# Patient Record
Sex: FEMALE | Race: WHITE
[De-identification: names, ages, dates, MRNs, and addresses within clinical notes are randomized per-mention and may not be internally consistent; named-entity substitution may affect disease eponyms.]

---

## 2017-01-17 ENCOUNTER — HOSPITAL ENCOUNTER (OUTPATIENT)
Dept: HOSPITAL 10 - OBT | Age: 22
Discharge: HOME | End: 2017-01-17
Attending: OBSTETRICS & GYNECOLOGY
Payer: MEDICAID

## 2017-01-17 VITALS — RESPIRATION RATE: 18 BRPM | DIASTOLIC BLOOD PRESSURE: 77 MMHG | SYSTOLIC BLOOD PRESSURE: 119 MMHG

## 2017-01-17 VITALS
BODY MASS INDEX: 28.98 KG/M2 | WEIGHT: 143.74 LBS | HEIGHT: 59 IN | HEIGHT: 59 IN | BODY MASS INDEX: 28.98 KG/M2 | WEIGHT: 143.74 LBS

## 2017-01-17 DIAGNOSIS — O26.893: Primary | ICD-10-CM

## 2017-01-17 DIAGNOSIS — R42: ICD-10-CM

## 2017-01-17 DIAGNOSIS — Z3A.34: ICD-10-CM

## 2017-01-17 DIAGNOSIS — R10.9: ICD-10-CM

## 2017-01-17 LAB
ALBUMIN SERPL-MCNC: 3.5 G/DL
ALBUMIN/GLOB SERPL: 1.06 {RATIO}
ALP SERPL-CCNC: 146 IU/L
ALT SERPL-CCNC: 25 IU/L
ANION GAP SERPL CALC-SCNC: 16 MMOL/L
AST SERPL-CCNC: 19 IU/L
BASOPHILS # BLD AUTO: 0 10^3/UL
BASOPHILS NFR BLD: 0.4 %
BILIRUB DIRECT SERPL-MCNC: 0 MG/DL
BILIRUB SERPL-MCNC: 0.3 MG/DL
BUN SERPL-MCNC: 2 MG/DL
CALCIUM SERPL-MCNC: 8.8 MG/DL
CHLORIDE SERPL-SCNC: 105 MMOL/L
CO2 SERPL-SCNC: 21 MMOL/L
CONDITION: 1
CREAT SERPL-MCNC: 0.39 MG/DL
EOSINOPHIL # BLD: 0 10^3/UL
EOSINOPHIL NFR BLD: 0.5 %
ERYTHROCYTE [DISTWIDTH] IN BLOOD BY AUTOMATED COUNT: 13.7 %
GLOBULIN SER-MCNC: 3.3 G/DL
GLUCOSE SERPL-MCNC: 76 MG/DL
HCT VFR BLD CALC: 36.5 %
HGB BLD-MCNC: 12.3 G/DL
LYMPHOCYTES # BLD AUTO: 1.8 10^3/UL
LYMPHOCYTES NFR BLD AUTO: 19.2 %
MCH RBC QN AUTO: 31.3 PG
MCHC RBC AUTO-ENTMCNC: 33.6 G/DL
MCV RBC AUTO: 93 FL
MONOCYTES # BLD: 0.7 10^3/UL
MONOCYTES NFR BLD: 7.1 %
NEUTROPHILS # BLD: 7 10^3/UL
NEUTROPHILS NFR BLD AUTO: 72.8 %
NRBC # BLD MANUAL: 0 10^3/UL
NRBC BLD QL: 0 /100WBC
PLATELET # BLD: 306 10^3/UL
PMV BLD AUTO: 7.2 FL
POTASSIUM SERPL-SCNC: 3.6 MMOL/L
PROT SERPL-MCNC: 6.8 G/DL
RBC # BLD AUTO: 3.93 10^6/UL
SODIUM SERPL-SCNC: 138 MMOL/L
WBC # BLD AUTO: 9.6 10^3/UL
WBC # BLD: 9.6 10^3/UL

## 2017-01-17 PROCEDURE — 76818 FETAL BIOPHYS PROFILE W/NST: CPT

## 2017-01-17 PROCEDURE — 81003 URINALYSIS AUTO W/O SCOPE: CPT

## 2017-01-17 PROCEDURE — 80053 COMPREHEN METABOLIC PANEL: CPT

## 2017-01-17 PROCEDURE — 85025 COMPLETE CBC W/AUTO DIFF WBC: CPT

## 2017-01-17 PROCEDURE — 96360 HYDRATION IV INFUSION INIT: CPT

## 2017-01-17 PROCEDURE — G0463 HOSPITAL OUTPT CLINIC VISIT: HCPCS

## 2017-01-17 PROCEDURE — 76816 OB US FOLLOW-UP PER FETUS: CPT

## 2017-01-17 NOTE — RADRPT
PROCEDURE:   US OB. 

 

CLINICAL INDICATION:   Abdominal pain. 

 

TECHNIQUE:   Multiple sonographic images of the pelvis were obtained.  Transabdominal imaging only w
as performed.  The images were reviewed on a PACS workstation. 

 

COMPARISON:   No prior studies are available for comparison. 

 

FINDINGS:

There is a single viable intrauterine gestation.  

Cardiac activity is present with 124 beats per minute. 

There is a cephalic presentation. 

 

Measurements were made in order to determine fetal age. The results are as follows:

BPD = 8.48 cm

HC = 30.74 cm

AC = 30.48 cm

FL = 6.59 cm

Estimated gestational age of approximately 34 weeks 2 days.  

The estimated date of delivery is 02/26/2017.  

The EFW = 2387 g.

EFW percentile: 29%

 

The placenta is left lateral, grade 2. There is no evidence for an abruption or placenta previa.

 

 

IMPRESSION:

 

1.  Single viable intrauterine gestation of approximately 34 weeks 2 days, based on ultrasound measu
rements. The estimated date of delivery is 02/26/2017. 

2.  EFW percentile: 29%.

 

 

RPTAT: HTAR

_____________________________________________ 

.Tay Collazo MD, MD           Date    Time 

Electronically viewed and signed by .Tay Collazo MD, MD on 01/17/2017 22:08 

 

D:  01/17/2017 22:08  T:  01/17/2017 22:08

.R/

## 2017-01-17 NOTE — RADRPT
PROCEDURE:   OB ultrasound for biophysical profile 

 

CLINICAL INDICATION:   Abdominal pain.

 

TECHNIQUE:   Multiple sonographic images of the pelvis were obtained.  Transabdominal view of the gr
avid uterus are available for review.  The images were reviewed on a PACS workstation.  

 

COMPARISON:   None 

 

FINDINGS:

 

Fetal breathing movement = 2/2

Fetal tone = 2/2

Fetal motion = 2/2

Amniotic fluid = 2/2

 

RUTH = 10.8 cm

Single live intrauterine pregnancy in cephalic presentation with fetal cardiac activity (129 bpm).  

Left lateral placenta, grade 2.

 

IMPRESSION:

 

1.  Single viable intrauterine gestation.  

2.  Biophysical profile = 8/8.

3.  RUTH = 10.8 cm. 

 

 

RPTAT: HTAR

_____________________________________________ 

.Tay Collazo MD, MD           Date    Time 

Electronically viewed and signed by .Tay Collazo MD, MD on 01/17/2017 22:09 

 

D:  01/17/2017 22:09  T:  01/17/2017 22:09

.R/

## 2017-01-17 NOTE — QN
Documentation


Comment


OB tiage:


@34+wks GA with dizziness and some pressure


No VB +FM No LOF No CTXs


NST reassuring for GA 


[place] WNL


--->BPP Vaginal exam and Labs and Iv hydration ordered


If patient feels improved and labos and Ultrasonographic findings are wnl she 

can be discharged











SOCRATES SUN M.D. 2017 20:21

## 2017-01-24 ENCOUNTER — HOSPITAL ENCOUNTER (OUTPATIENT)
Dept: HOSPITAL 10 - OBT | Age: 22
Discharge: HOME | End: 2017-01-24
Attending: OBSTETRICS & GYNECOLOGY
Payer: MEDICAID

## 2017-01-24 VITALS — DIASTOLIC BLOOD PRESSURE: 66 MMHG | HEART RATE: 108 BPM | SYSTOLIC BLOOD PRESSURE: 118 MMHG | RESPIRATION RATE: 18 BRPM

## 2017-01-24 VITALS
BODY MASS INDEX: 27.38 KG/M2 | BODY MASS INDEX: 27.38 KG/M2 | HEIGHT: 59 IN | HEIGHT: 59 IN | WEIGHT: 135.8 LBS | WEIGHT: 135.8 LBS

## 2017-01-24 DIAGNOSIS — Z3A.35: ICD-10-CM

## 2017-01-24 DIAGNOSIS — O13.3: Primary | ICD-10-CM

## 2017-01-24 LAB
ADD UMIC: YES
ALBUMIN SERPL-MCNC: 3.2 G/DL (ref 3.3–4.9)
ALBUMIN/GLOB SERPL: 1.03 {RATIO}
ALP SERPL-CCNC: 132 IU/L (ref 42–121)
ALT SERPL-CCNC: 22 IU/L (ref 13–69)
ANION GAP SERPL CALC-SCNC: 11 MMOL/L (ref 8–16)
AST SERPL-CCNC: 18 IU/L (ref 15–46)
BACTERIA #/AREA URNS HPF: (no result) /[HPF]
BASOPHILS # BLD AUTO: 0 10^3/UL (ref 0–0.1)
BASOPHILS NFR BLD: 0.3 % (ref 0–2)
BILIRUB DIRECT SERPL-MCNC: 0 MG/DL (ref 0–0.2)
BILIRUB SERPL-MCNC: 0.2 MG/DL (ref 0.2–1.3)
BUN SERPL-MCNC: 4 MG/DL (ref 7–20)
CALCIUM SERPL-MCNC: 8.8 MG/DL (ref 8.4–10.2)
CHLORIDE SERPL-SCNC: 104 MMOL/L (ref 97–110)
CO2 SERPL-SCNC: 25 MMOL/L (ref 21–31)
COLOR UR: (no result)
CONDITION: 1
CREAT SERPL-MCNC: 0.52 MG/DL (ref 0.44–1)
EOSINOPHIL # BLD: 0.1 10^3/UL (ref 0–0.5)
EOSINOPHIL NFR BLD: 0.8 % (ref 0–7)
ERYTHROCYTE [DISTWIDTH] IN BLOOD BY AUTOMATED COUNT: 13.9 % (ref 11.5–14.5)
GLOBULIN SER-MCNC: 3.1 G/DL (ref 1.3–3.2)
GLUCOSE SERPL-MCNC: 97 MG/DL (ref 70–220)
HCT VFR BLD CALC: 34 % (ref 37–47)
HGB BLD-MCNC: 11.4 G/DL (ref 12–16)
KETONES UR STRIP.AUTO-MCNC: NEGATIVE MG/DL
LYMPHOCYTES # BLD AUTO: 1.9 10^3/UL (ref 0.8–2.9)
LYMPHOCYTES NFR BLD AUTO: 19.6 % (ref 15–51)
MCH RBC QN AUTO: 31.4 PG (ref 29–33)
MCHC RBC AUTO-ENTMCNC: 33.6 G/DL (ref 32–37)
MCV RBC AUTO: 93.4 FL (ref 82–101)
MONOCYTES # BLD: 0.8 10^3/UL (ref 0.3–0.9)
MONOCYTES NFR BLD: 8.4 % (ref 0–11)
NEUTROPHILS # BLD: 6.8 10^3/UL (ref 1.6–7.5)
NEUTROPHILS NFR BLD AUTO: 70.9 % (ref 39–77)
NITRITE UR QL STRIP.AUTO: NEGATIVE
NRBC # BLD MANUAL: 0 10^3/UL (ref 0–0)
NRBC BLD QL: 0 /100WBC (ref 0–0)
PLATELET # BLD: 282 10^3/UL (ref 140–440)
PMV BLD AUTO: 6.6 FL (ref 7.4–10.4)
POTASSIUM SERPL-SCNC: 4.3 MMOL/L (ref 3.5–5.1)
PROT SERPL-MCNC: 6.3 G/DL (ref 6.1–8.1)
RBC # BLD AUTO: 3.64 10^6/UL (ref 4.2–5.4)
RBC # UR AUTO: NEGATIVE /UL
RBC #/AREA URNS HPF: (no result) /HPF
SODIUM SERPL-SCNC: 136 MMOL/L (ref 135–144)
SQUAMOUS #/AREA URNS HPF: (no result) /[HPF]
URATE SERPL-MCNC: 4.2 MG/DL (ref 3.1–7.9)
URINE BILIRUBIN (DIP): NEGATIVE
URINE TOTAL PROTEIN (DIP): NEGATIVE
UROBILINOGEN UR STRIP-ACNC: (no result) (ref 0.1–1)
WBC # BLD AUTO: 9.6 10^3/UL (ref 4.8–10.8)
WBC # BLD: 9.6 10^3/UL (ref 4.8–10.8)
WBC # UR STRIP: (no result) /UL

## 2017-01-24 PROCEDURE — 80053 COMPREHEN METABOLIC PANEL: CPT

## 2017-01-24 PROCEDURE — G0463 HOSPITAL OUTPT CLINIC VISIT: HCPCS

## 2017-01-24 PROCEDURE — 76818 FETAL BIOPHYS PROFILE W/NST: CPT

## 2017-01-24 PROCEDURE — 85025 COMPLETE CBC W/AUTO DIFF WBC: CPT

## 2017-01-24 PROCEDURE — 81003 URINALYSIS AUTO W/O SCOPE: CPT

## 2017-01-24 PROCEDURE — 81001 URINALYSIS AUTO W/SCOPE: CPT

## 2017-01-24 PROCEDURE — 84560 ASSAY OF URINE/URIC ACID: CPT

## 2017-01-24 NOTE — RADRPT
PROCEDURE:   OB ultrasound for biophysical profile 

 

CLINICAL INDICATION:   Pregnancy induced hypertension.  Clinical estimated gestational age is 35 wee
ks 6 days with estimated date of delivery 02/22/2017.

 

TECHNIQUE:   Multiple sonographic images of the pelvis were obtained.  Transabdominal view of the gr
avid uterus are available for review.  The images were reviewed on a PACS workstation.  

 

COMPARISON:   01/17/2017 

 

FINDINGS:

 

Fetal breathing movement = 2/2

Fetal tone = 2/2

Fetal motion = 2/2

RUTH = 2/2

 

RUTH = 11.75 cm

Single live intrauterine pregnancy in cephalic position with fetal cardiac activity. Fetal heart rat
e equals 131 beats per minute. The placenta is left lateral, grade II  

 

IMPRESSION:

 

1.  Single live intrauterine gestation.  

2.  Biophysical profile = 8/8.

3.  RUTH = 11.75 cm. 

 

 

RPTAT: HJES

_____________________________________________ 

.Titi Guerrero MD, MD           Date    Time 

Electronically viewed and signed by .Titi Guerrero MD, MD on 01/24/2017 22:25 

 

D:  01/24/2017 22:25  T:  01/24/2017 22:25

.S/

## 2017-01-24 NOTE — HP
Date/Time of Note


Date/Time of Note


DATE: 17 


TIME: 23:06





OB - History


Hx of Present Pregnancy


Chief Complaint:  Evaluation of oreeclampsia


Estimated Due Date:  2017


:  1


Para:  0


Spontaneous :  0


Therapeutic :  0


Prenatal Care:  Good Care


Obstetrical Complications:  None


Medical Complications:  None





Past Family/Social History


*


Past Medical, Surgical, Family and Obstetric Histories reviewed from prenatal 

chart.





OB  Admission Exam


Vital Signs


Vital Signs





 Vital Signs








  Date Time  Temp Pulse Resp B/P Pulse Ox O2 Delivery O2 Flow Rate FiO2


 


17 21:03 98.4 108 18 118/66 99 Room Air  











Physical Exam


HEENT:  WNL


Heart:  Rhythm Normal


Lungs:  Clear


Abdomen:  WNL


Extremities:  Normal


Reflexes:  Normal


Last 72 hours Lab Results


 CBC & BMP


17 22:05











 Liver Function








Test


  17


22:05


 


Alanine Aminotransferase


(ALT/SGPT) 22  


 


 


Albumin 3.2  L


 


Alkaline Phosphatase 132  H


 


Aspartate Amino Transf


(AST/SGOT) 18  


 


 


Direct Bilirubin 0.00  


 


Total Protein 6.3  











OB  Assessment/Plan


Reason for admission:  observation


Plan:  Other (Work up revealed no sign of preeclampsia)


Other plan:


D/C home











ZULEIMA BALDWIN MD 2017 23:10

## 2017-01-25 NOTE — TRIAGE
===================================

OB Triage

===================================

Datetime Report Generated by CPN: 01/25/2017 00:25

   

   

===========================

Datetime: 01/24/2017 23:00

===========================

   

 Stage of Pregnancy:  OB Triage

   

===================================

Labor Evaluation

===================================

   

 Frequency:  occasional 

 Monitor Mode:  External

 Duration (sec)2399:  70

 Quality:  Mild

 Pattern:  Normal: <= 5 Contractions in 10 Minutes

 Resting Tone [place]:  Relaxed

   

===================================

Fetal Heart Rate

===================================

   

 FHR Baseline Rate:  125

 Monitor Mode:  External US

 Variability:  Moderate 6-25 bpm

 Accelerations:  15X15

 Decelerations:  None

 Category:  Category I

   

===================================

Pain Assessment

===================================

   

 Pain Scale:  0

 Pain Presence:  None/Denies

 Pain Type:  N/A

 Pain Goal:  0

 Pain Relief Measures:  Comfort Measures

   

===========================

Datetime: 01/24/2017 22:10

===========================

   

 Stage of Pregnancy:  OB Triage

   

===================================

Labor Evaluation

===================================

   

 Frequency:  occasional 

 Monitor Mode:  External

 Duration (sec)2399:  70

 Quality:  Mild

 Pattern:  Normal: <= 5 Contractions in 10 Minutes

 Resting Tone [place]:  Relaxed

   

===================================

Fetal Heart Rate

===================================

   

 FHR Baseline Rate:  130

 Monitor Mode:  External US

 Variability:  Moderate 6-25 bpm

 Accelerations:  15X15

 Decelerations:  None

 Category:  Category I

   

===========================

Datetime: 01/24/2017 21:00

===========================

   

   

===================================

Maternal Assessment

===================================

   

 Level of Consciousness:  Fully Conscious

 DTR's/Clonus:  DTRs 2+; No Clonus

 Headache:  Denies

 Blurred Vision:  No

 Nausea/Vomiting:  Denies

 RUQ Epigastric Pain:  Denies

 Facial Edema:  None

   

===================================

Labor Evaluation

===================================

   

 Frequency:  x2

 Monitor Mode:  External

 Duration (sec)2399:  70-90

 Quality:  Mild

 Pattern:  Normal: <= 5 Contractions in 10 Minutes

 Resting Tone [place]:  Relaxed

   

===================================

Fetal Heart Rate

===================================

   

 FHR Baseline Rate:  135

 Monitor Mode:  External US

 Variability:  Moderate 6-25 bpm

 Accelerations:  15X15

 Decelerations:  None

 Category:  Category I

   

===================================

Pain Assessment

===================================

   

 Pain Scale:  0

 Pain Presence:  None/Denies

 Pain Type:  N/A

 Pain Goal:  0

   

===========================

Datetime: 01/24/2017 20:40

===========================

   

 Assessment Type:  Triage

   

===================================

Maternal Assessment

===================================

   

 Level of Consciousness:  Fully Conscious

 DTR's/Clonus:  DTRs 2+; No Clonus

 Headache:  Denies

 Blurred Vision:  No

 Respiratory Effort:  Unlabored

 Nausea/Vomiting:  Denies

 RUQ Epigastric Pain:  Denies

 Lower Extremities Edema:  None

     Degree:  None

 Upper Extremities Edema:  None

     Degree:  None

 Facial Edema:  None

   

===================================

Fall Risk Assessment

===================================

   

 History of Falling:  (0) No

 Secondary Diagnosis:  (0) No

 Ambulatory Aid:  (0) Bedrest/Nurse Assist

 IV Therapy:  (0) No

 Gait:  (0) Normal/Bedrest/Immobile

 Mental Status:  (0) Oriented to Own Ability

 Fall Score:  0

 Fall Risk Score Definition:  No Risk: No action required

   

===========================

Datetime: 01/24/2017 20:15

===========================

   

 Time of Arrival:  01/24/2017 20:12

 EGA:  35.6

 Arrived By:  Ambulatory

 Arrived From:  Dr. Office

 Chief Complaint:  sent in from clinic PIH 

 Fetal Movement:  Present

 Contractions:  Occasional

 Vaginal Bleeding:  None

 Vaginal Discharge:  Denies

 Recent Sexual Intercouse:  Denies

 Abdominal Trauma:  Not Applicable

 Patient Complaints:  Other

 Initial Plan:  NST, CBC, CMP, uric acid, EFW, BPP 

   

===========================

Datetime: 01/17/2017 19:59

===========================

   

 EGA:  34.6

   

===========================

Datetime: 01/17/2017 19:39

===========================

   

 Fall Score:  0

 Fall Risk Score Definition:  No Risk: No action required

## 2017-02-15 ENCOUNTER — HOSPITAL ENCOUNTER (OUTPATIENT)
Dept: HOSPITAL 10 - OBT | Age: 22
Discharge: HOME | End: 2017-02-15
Attending: OBSTETRICS & GYNECOLOGY
Payer: MEDICAID

## 2017-02-15 VITALS — SYSTOLIC BLOOD PRESSURE: 120 MMHG | DIASTOLIC BLOOD PRESSURE: 78 MMHG

## 2017-02-15 VITALS
HEIGHT: 59 IN | WEIGHT: 155.65 LBS | BODY MASS INDEX: 31.38 KG/M2 | BODY MASS INDEX: 31.38 KG/M2 | HEIGHT: 59 IN | WEIGHT: 155.65 LBS

## 2017-02-15 DIAGNOSIS — Z3A.39: ICD-10-CM

## 2017-02-15 DIAGNOSIS — O21.0: ICD-10-CM

## 2017-02-15 DIAGNOSIS — R51: ICD-10-CM

## 2017-02-15 LAB
ADD UMIC: YES
ALBUMIN SERPL-MCNC: 3.2 G/DL (ref 3.3–4.9)
ALBUMIN/GLOB SERPL: 1.03 {RATIO}
ALP SERPL-CCNC: 177 IU/L (ref 42–121)
ALT SERPL-CCNC: 26 IU/L (ref 13–69)
ANION GAP SERPL CALC-SCNC: 13 MMOL/L (ref 8–16)
APTT BLD: 28.1 SEC (ref 25–35)
AST SERPL-CCNC: 23 IU/L (ref 15–46)
BACTERIA #/AREA URNS HPF: (no result) /[HPF]
BASOPHILS # BLD AUTO: 0 10^3/UL (ref 0–0.1)
BASOPHILS NFR BLD: 0.3 % (ref 0–2)
BILIRUB DIRECT SERPL-MCNC: 0 MG/DL (ref 0–0.2)
BILIRUB SERPL-MCNC: 0.3 MG/DL (ref 0.2–1.3)
BUN SERPL-MCNC: 4 MG/DL (ref 7–20)
CALCIUM SERPL-MCNC: 8.7 MG/DL (ref 8.4–10.2)
CHLORIDE SERPL-SCNC: 104 MMOL/L (ref 97–110)
CO2 SERPL-SCNC: 24 MMOL/L (ref 21–31)
COLOR UR: (no result)
CONDITION: 1
CREAT SERPL-MCNC: 0.45 MG/DL (ref 0.44–1)
EOSINOPHIL # BLD: 0 10^3/UL (ref 0–0.5)
EOSINOPHIL NFR BLD: 0.4 % (ref 0–7)
ERYTHROCYTE [DISTWIDTH] IN BLOOD BY AUTOMATED COUNT: 14.5 % (ref 11.5–14.5)
GLOBULIN SER-MCNC: 3.1 G/DL (ref 1.3–3.2)
GLUCOSE SERPL-MCNC: 83 MG/DL (ref 70–220)
GLUCOSE UR STRIP-MCNC: NEGATIVE %
HCT VFR BLD CALC: 36.1 % (ref 37–47)
HGB BLD-MCNC: 12.1 G/DL (ref 12–16)
INR PPP: 0.9
KETONES UR STRIP.AUTO-MCNC: NEGATIVE MG/DL
LYMPHOCYTES # BLD AUTO: 1.7 10^3/UL (ref 0.8–2.9)
LYMPHOCYTES NFR BLD AUTO: 17.1 % (ref 15–51)
MCH RBC QN AUTO: 31.5 PG (ref 29–33)
MCHC RBC AUTO-ENTMCNC: 33.6 G/DL (ref 32–37)
MCV RBC AUTO: 93.8 FL (ref 82–101)
MONOCYTES # BLD: 0.8 10^3/UL (ref 0.3–0.9)
MONOCYTES NFR BLD: 8.2 % (ref 0–11)
NEUTROPHILS # BLD: 7.4 10^3/UL (ref 1.6–7.5)
NEUTROPHILS NFR BLD AUTO: 74 % (ref 39–77)
NITRITE UR QL STRIP.AUTO: NEGATIVE
NRBC # BLD MANUAL: 0 10^3/UL (ref 0–0)
NRBC BLD QL: 0 /100WBC (ref 0–0)
PLATELET # BLD: 275 10^3/UL (ref 140–440)
PMV BLD AUTO: 7.1 FL (ref 7.4–10.4)
POTASSIUM SERPL-SCNC: 3.9 MMOL/L (ref 3.5–5.1)
PROT SERPL-MCNC: 6.3 G/DL (ref 6.1–8.1)
PROTHROMBIN TIME: 12.1 SEC (ref 12.2–14.2)
PT RATIO: 0.9
RBC # BLD AUTO: 3.84 10^6/UL (ref 4.2–5.4)
RBC # UR AUTO: NEGATIVE /UL
RBC #/AREA URNS HPF: (no result) /HPF
SODIUM SERPL-SCNC: 137 MMOL/L (ref 135–144)
URATE SERPL-MCNC: 4.2 MG/DL (ref 3.1–7.9)
URINE BILIRUBIN (DIP): NEGATIVE
URINE TOTAL PROTEIN (DIP): NEGATIVE
UROBILINOGEN UR STRIP-ACNC: (no result) (ref 0.1–1)
WBC # BLD AUTO: 10 10^3/UL (ref 4.8–10.8)
WBC # BLD: 10 10^3/UL (ref 4.8–10.8)
WBC # UR STRIP: (no result) /UL

## 2017-02-15 PROCEDURE — 85610 PROTHROMBIN TIME: CPT

## 2017-02-15 PROCEDURE — 76818 FETAL BIOPHYS PROFILE W/NST: CPT

## 2017-02-15 PROCEDURE — 81001 URINALYSIS AUTO W/SCOPE: CPT

## 2017-02-15 PROCEDURE — 81003 URINALYSIS AUTO W/O SCOPE: CPT

## 2017-02-15 PROCEDURE — 80053 COMPREHEN METABOLIC PANEL: CPT

## 2017-02-15 PROCEDURE — 85025 COMPLETE CBC W/AUTO DIFF WBC: CPT

## 2017-02-15 PROCEDURE — 85730 THROMBOPLASTIN TIME PARTIAL: CPT

## 2017-02-15 PROCEDURE — 84560 ASSAY OF URINE/URIC ACID: CPT

## 2017-02-15 NOTE — RADRPT
PROCEDURE:   Biophysical profile 

 

CLINICAL INDICATION:   Headache 

 

TECHNIQUE:   Color and gray-scale ultrasound images of an intrauterine gestation were obtained. 

 

COMPARISON:   January 24, 2017 

 

FINDINGS:

A single live intrauterine gestation is identified in cephalic position with an estimated fetal hear
t rate of 141 beats per minute.  The placenta is located fundally and has a grade of 2.  The cervix 
is obscured by head shadows.  No evidence of  abruption identified.  RUTH is  14.6 cm.

 

Fetal movement 2/2.

Fetal tone 2/2.

Fetal breathing movement 2/2.

Qualitative AFV 2/2

 

Total biophysical profile 8/8 

 

IMPRESSION:

8/8 biophysical profile. 

 

 

RPTAT: AA

_____________________________________________ 

.Chuck Cardozo MD, MD           Date    Time 

Electronically viewed and signed by .Chuck Cardozo MD, MD on 02/15/2017 18:19 

 

D:  02/15/2017 18:19  T:  02/15/2017 18:19

.P/

## 2017-02-15 NOTE — TRIAGE
===================================

OB Triage

===================================

Datetime Report Generated by CPN: 02/15/2017 22:20

   

   

===========================

Datetime: 02/15/2017 19:29

===========================

   

   

===================================

Maternal Assessment

===================================

   

 Level of Consciousness:  Fully Conscious

 Headache:  Generalized

 Blurred Vision:  No

 Nausea/Vomiting:  Hx of Nausea/Vomiting

 RUQ Epigastric Pain:  Denies

 Facial Edema:  None

   

===================================

Labor Evaluation

===================================

   

 Frequency:  3-6

 Monitor Mode:  External

 Duration (sec)2399:  40-60

 Quality:  Mild

 Pattern:  Normal: <= 5 Contractions in 10 Minutes

 Resting Tone [place]:  Relaxed

   

===================================

Fetal Heart Rate

===================================

   

 FHR Baseline Rate:  120

 Monitor Mode:  External US

 FHR Baseline Changes:  No Baseline Change

 Variability:  Moderate 6-25 bpm

 Accelerations:  15X15

 Decelerations:  None

 Category:  Category I

   

===========================

Datetime: 02/15/2017 18:54

===========================

   

   

===================================

Fetal Heart Rate

===================================

   

 FHR Baseline Rate:  125

 Monitor Mode:  External US

 Variability:  Moderate 6-25 bpm

 Accelerations:  15X15

 Decelerations:  None

 Category:  Category I

   

===========================

Datetime: 02/15/2017 18:44

===========================

   

   

===================================

Labor Evaluation

===================================

   

 Frequency:  1-5

 Monitor Mode:  External

 Duration (sec)2399:  30-50

 Pattern:  Normal: <= 5 Contractions in 10 Minutes

 Resting Tone [place]:  Relaxed

   

===================================

Fetal Heart Rate

===================================

   

 FHR Baseline Rate:  125

 Variability:  Moderate 6-25 bpm

 Accelerations:  15X15

 Decelerations:  None

 Category:  Category I

   

===========================

Datetime: 02/15/2017 18:05

===========================

   

 Comments:  US AT  BEDSIDE 

   

===========================

Datetime: 02/15/2017 17:39

===========================

   

 Stage of Pregnancy:  OB Triage

   

===================================

Maternal Assessment

===================================

   

 Level of Consciousness:  Fully Conscious

 DTR's/Clonus:  DTRs 2+; No Clonus

 Headache:  Denies

 Blurred Vision:  No

 Respiratory Effort:  Unlabored; Regular Rhythm; Equal Expansion

 Breath Sounds, Left:  Clear and Equal

 Breath Sounds, Right:  Clear and Equal

 Nausea/Vomiting:  Denies

 RUQ Epigastric Pain:  Denies

 Facial Edema:  None

 Temperature Route:  Axillary

   

===================================

Fall Risk Assessment

===================================

   

 History of Falling:  (0) No

 Secondary Diagnosis:  (0) No

 Ambulatory Aid:  (0) Bedrest/Nurse Assist

 IV Therapy:  (0) No

 Gait:  (0) Normal/Bedrest/Immobile

 Mental Status:  (0) Oriented to Own Ability

 Fall Score:  0

 Fall Risk Score Definition:  No Risk: No action required

 Monitor Mode:  External

   

===================================

Fetal Heart Rate

===================================

   

 FHR Baseline Rate:  135

 Monitor Mode:  External US

 Variability:  Moderate 6-25 bpm

 Accelerations:  15X15

 Decelerations:  None

 Category:  Category I

   

===================================

Pain Assessment

===================================

   

 Pain Scale:  5

 Pain Presence:  Intermittent

 Pain Type:  Cramping

   

===================================

Vaginal Exam

===================================

   

 Dilatation (cms):  0.0

 Effacement (%):  50

 Station:  -3

 Exam By:  ANTONIO LONGO 

   

===========================

Datetime: 02/15/2017 17:37

===========================

   

 Time of Arrival:  02/15/2017 17:16

 EGA:  39.0

 Arrived By:  Ambulatory

 Arrived From:  Home

 Chief Complaint:  N/V,  HEADACHE, PRESSURE 

 Fetal Movement:  Present

 Rupture of Membranes:  Denies

 Vaginal Discharge:  Denies

 Recent Sexual Intercouse:  Denies

 Abdominal Trauma:  Not Applicable

 Patient Complaints:  Cramping; Nausea; Vomiting

 Time Provider Notified:  02/15/2017 17:16

 Provider Notified:  DR. GUNDERSON

 Initial Plan:  NST

   

===========================

Datetime: 01/24/2017 20:40

===========================

   

 Fall Score:  0

 Fall Risk Score Definition:  No Risk: No action required

   

===========================

Datetime: 01/24/2017 20:15

===========================

   

 EGA:  35.6

 Chief Complaint:  sent in from clinic for [facility] panel 

 Time Provider Notified:  01/24/2017 21:00

 Provider Notified:  Delshad

   

===========================

Datetime: 01/17/2017 19:59

===========================

   

 EGA:  34.6

   

===========================

Datetime: 01/17/2017 19:39

===========================

   

 Fall Score:  0

 Fall Risk Score Definition:  No Risk: No action required

## 2017-02-15 NOTE — QN
Documentation


Comment


22-year-old  with IUP at 39 weeks with prenatal care at [facility] 

presented with complaint of cramps and contractions and headache with nausea.





Patient had one-time elevated blood pressure 130s over 80s range when she was 

in the office visit.  She currently denies any blurred vision or epigastric 

pain or right upper quadrant pain.





She denies any leaking of fluid, vaginal bleeding or decreased fetal movement.





Her nausea started since today and she had vomiting 1.





General appearance: Patient is alert and oriented and is in mild distress.





Abdomen is soft, nontender, no rebound tenderness, no guarding no rigidity.  

Next





Fundal height consistent with gestational age.





NST: Category 1.  Some contractions on the monitor seen.  Blood pressures 

serially checked during observation all between 110-120s over 70s.





PIH panel negative





Cervical exam: Closed/long and high.





BPP: 





RUTH: 14.6


PROCEDURE:   Biophysical profile 


 


CLINICAL INDICATION:   Headache 


 


TECHNIQUE:   Color and gray-scale ultrasound images of an intrauterine 

gestation were obtained. 


 


COMPARISON:   2017 


 


FINDINGS:


A single live intrauterine gestation is identified in cephalic position with an 

estimated fetal heart rate of 141 beats per minute.  The placenta is located 

fundally and has a grade of 2.  The cervix is obscured by head shadows.  No 

evidence of  abruption identified.  RUTH is  14.6 cm.


 


Fetal movement 2/2.


Fetal tone 2/2.


Fetal breathing movement 2/2.


Qualitative AFV 2/2


 


Total biophysical profile  


 


IMPRESSION:


 biophysical profile. 


 





PIH labs: negative








Assessment IUP at 39 weeks





Headache, transient elevated borderline blood pressure.  No clear evidence of 

preeclampsia.  Headache resolved with Tylenol next





PIH labs normal





False labor pain, resolved with hydration





No cervical change noted in repeat examination





 testing reassuring





Patient was discharged home with strict preeclampsia precautions as well as 

labor precautions and fetal kick count next





Follow-up with OB clinic in the next 2-3 days recommended





Signs and symptoms of preeclampsia discussed in detail with the patient





RT to triage as needed any other concerns











REGIS COLE MD Feb 15, 2017 21:25

## 2017-02-16 ENCOUNTER — HOSPITAL ENCOUNTER (INPATIENT)
Dept: HOSPITAL 10 - OBT | Age: 22
Discharge: HOME | DRG: 778 | End: 2017-02-16
Attending: OBSTETRICS & GYNECOLOGY | Admitting: OBSTETRICS & GYNECOLOGY
Payer: MEDICAID

## 2017-02-16 VITALS — SYSTOLIC BLOOD PRESSURE: 120 MMHG | DIASTOLIC BLOOD PRESSURE: 65 MMHG | HEART RATE: 98 BPM | RESPIRATION RATE: 18 BRPM

## 2017-02-16 VITALS
HEIGHT: 59 IN | BODY MASS INDEX: 30.8 KG/M2 | WEIGHT: 152.78 LBS | HEIGHT: 59 IN | BODY MASS INDEX: 30.8 KG/M2 | WEIGHT: 152.78 LBS

## 2017-02-16 DIAGNOSIS — O60.03: Primary | ICD-10-CM

## 2017-02-16 DIAGNOSIS — Z3A.39: ICD-10-CM

## 2017-02-16 LAB
APTT BLD: 27.7 SEC (ref 25–35)
BASOPHILS # BLD AUTO: 0 10^3/UL (ref 0–0.1)
BASOPHILS NFR BLD: 0.2 % (ref 0–2)
CONDITION: 1
EOSINOPHIL # BLD: 0 10^3/UL (ref 0–0.5)
EOSINOPHIL NFR BLD: 0.1 % (ref 0–7)
ERYTHROCYTE [DISTWIDTH] IN BLOOD BY AUTOMATED COUNT: 14.6 % (ref 11.5–14.5)
HCT VFR BLD CALC: 37.2 % (ref 37–47)
HGB BLD-MCNC: 12.6 G/DL (ref 12–16)
INR PPP: 0.97
LH ANALYZER COMMENTS: 1
LYMPHOCYTES # BLD AUTO: 1.6 10^3/UL (ref 0.8–2.9)
LYMPHOCYTES NFR BLD AUTO: 17.3 % (ref 15–51)
MCH RBC QN AUTO: 31.5 PG (ref 29–33)
MCHC RBC AUTO-ENTMCNC: 33.8 G/DL (ref 32–37)
MCV RBC AUTO: 93.2 FL (ref 82–101)
MONOCYTES # BLD: 0.6 10^3/UL (ref 0.3–0.9)
MONOCYTES NFR BLD: 6.8 % (ref 0–11)
NEUTROPHILS # BLD: 7 10^3/UL (ref 1.6–7.5)
NEUTROPHILS NFR BLD AUTO: 75.6 % (ref 39–77)
NRBC # BLD MANUAL: 0 10^3/UL (ref 0–0)
NRBC BLD QL: 0 /100WBC (ref 0–0)
PLATELET # BLD: 284 10^3/UL (ref 140–440)
PMV BLD AUTO: 7.4 FL (ref 7.4–10.4)
PROTHROMBIN TIME: 12.9 SEC (ref 12.2–14.2)
PT RATIO: 1
RBC # BLD AUTO: 3.99 10^6/UL (ref 4.2–5.4)
WBC # BLD AUTO: 9.3 10^3/UL (ref 4.8–10.8)
WBC # BLD: 9.3 10^3/UL (ref 4.8–10.8)

## 2017-02-16 PROCEDURE — 86901 BLOOD TYPING SEROLOGIC RH(D): CPT

## 2017-02-16 PROCEDURE — 76818 FETAL BIOPHYS PROFILE W/NST: CPT

## 2017-02-16 PROCEDURE — 85025 COMPLETE CBC W/AUTO DIFF WBC: CPT

## 2017-02-16 PROCEDURE — 86900 BLOOD TYPING SEROLOGIC ABO: CPT

## 2017-02-16 PROCEDURE — 87340 HEPATITIS B SURFACE AG IA: CPT

## 2017-02-16 PROCEDURE — G0463 HOSPITAL OUTPT CLINIC VISIT: HCPCS

## 2017-02-16 PROCEDURE — 86592 SYPHILIS TEST NON-TREP QUAL: CPT

## 2017-02-16 PROCEDURE — 85610 PROTHROMBIN TIME: CPT

## 2017-02-16 PROCEDURE — 85730 THROMBOPLASTIN TIME PARTIAL: CPT

## 2017-02-16 NOTE — RADRPT
PROCEDURE:   US OB biophysical profile. 

 

CLINICAL INDICATION:    Fetal evaluation

 

TECHNIQUE:   Multiple sonographic images of the pelvis were obtained.  The images were reviewed on a
 PACS workstation. 

 

COMPARISON:   Obstetrical ultrasound from 02/15/2017 

 

FINDINGS:

 

There is a single viable intrauterine gestation.  Cardiac activity is present with 121 beats per min
[place]. 

There is a vertex presentation. 

The placenta is fundal.   There is no evidence of placental abruption.

There is a mildly low amount of amniotic fluid with an RUTH = 7.9 cm.

 

Biophysical profile:

Fetal movement 2/2

Fetal tone 2/2.

Fetal breathing 2/2 

RUTH 2/2

 

Total 8/8 

 

RPTAT: AA . 

 

IMPRESSION:

Normal biophysical profile. 

 

Mildly low RUTH of 7.9 cm, compared with an RUTH of 14.6 cm yesterday.

_____________________________________________ 

Physician Jazlyn           Date    Time 

Electronically viewed and signed by Physician Jazlyn on 02/16/2017 13:27 

 

D:  02/16/2017 13:27  T:  02/16/2017 13:27

RAVI/

## 2017-02-16 NOTE — CONS
Date/Time of Note


Date/Time of Note


DATE: 17 


TIME: 16:41





Consultation Date/Type/Reason


Admit Date/Time





Initial Consult Date


2017


Triage consult and H&P


This patient is a 22 years old  1 para 0 which is 39 weeks and 1 day 

today.


She came to triage area for repeat monitoring, 


, She actually was sent from the NST clinic to be further evaluated and 

possible induction,, 


, On examination her contractions very irregular and every 8-10 minutes, 


, Fetal heart tone is normal with good variability and acceleration baseline is 

around 115.,  


On pelvic examination cervix is closed 30% effaced and high -4 posterior. 


 Her blood pressure is 120/65 pulse rate 98 on ultrasound study her biophysical 

profile is 8 out of 8 .


, Her RUTH which was 14.6 yesterday today is 7.9 cm


I I discussed the case with Dr. Glass as well as her attending physician Dr. Hallman we will admit her for observation and most likely induction the patient 

family also insisting on her admission and induction





24 HR Interval Summary


Subjective hx not possible:  pt non-verbal, pt critical


Constitutional:  diaphoresis, no complaints





Detailed Summary


Eyes:  no complaints


ENT:  no complaints


Respiratory:  no complaints


Cardiovascular:  no complaints


Gastrointestinal:  no complaints


Genitourinary:  other


Musculoskeletal:  no complaints


Skin:  no complaints


Endocrine:  no complaints


Psychological:  no complaints


Immunologic:  no complaints


Additional Comments


Will admit for observation and induction.





Exam/Review of Systems


Vital Signs


Vitals





 Vital Signs








  Date Time  Temp Pulse Resp B/P Pulse Ox O2 Delivery O2 Flow Rate FiO2


 


17 10:27 98.2 98 18 120/65  Room Air  

















SASHA ARORA MD 2017 17:19

## 2017-02-17 NOTE — NSTRPT
===================================

NST Information

===================================

Datetime Report Generated by CPN: 02/17/2017 10:21

   

   

===========================

Datetime: 02/16/2017 08:11

===========================

   

   

===================================

NST Information

===================================

   

 EGA:  39.1

 Test Number:  6

 Time on Monitor:  02/16/2017 08:28

 Time off Monitor:  02/16/2017 09:10

 NST Duration (Min):  42

 Reason for NST:  Gestational Hypertension

 Test and Monitor Explained:  Monitor Explained; Test Explained; Verbalized Understanding

 Pulse:  74

 Resp:  18

 SBP:  107

 DBP:  58

   

===================================

Test Evaluation

===================================

   

 NST Interventions:  Reposition Patient; Acoustic Stimulation

 Patient States Fetal Movement:  Present

 Contraction Frequency:  x3, mild

 FHR Baseline :  110

 Variability:  Moderate 6-25bpm

 Accelerations:  15X15

 Decelerations:  Variable

 FHR Category:  Category I

 NST Results:  Questionable





 Comments:  PT TO U/S. RUTH 15.8, cephalic

   Dr. Tam informed of variable deceleration. MD recommends delivery of pt. 

   Pt to OB Triage

   

===================================

Electronically Signed By

===================================

   

 E-Signature:  Electronically signed by Jackelyn Bassett, MD on 2/17/2017 at 10:20  with User ID: [id number],
 Addendum/Amendment: Signed for Dr. Tafti

   

===========================

Datetime: 02/13/2017 08:11

===========================

   

   

===================================

NST Information

===================================

   

 EGA:  38.5

 NST Duration (Min):  26

   

===========================

Datetime: 02/09/2017 08:14

===========================

   

   

===================================

NST Information

===================================

   

 EGA:  38.1

 NST Duration (Min):  26

   

===========================

Datetime: 02/06/2017 08:10

===========================

   

   

===================================

NST Information

===================================

   

 EGA:  37.5

 NST Duration (Min):  33

   

===========================

Datetime: 02/02/2017 08:14

===========================

   

   

===================================

NST Information

===================================

   

 EGA:  37.1

 NST Duration (Min):  24

   

===========================

Datetime: 01/30/2017 08:16

===========================

   

   

===================================

NST Information

===================================

   

 EGA:  36.5

 NST Duration (Min):  40

## 2017-02-21 ENCOUNTER — HOSPITAL ENCOUNTER (OUTPATIENT)
Dept: HOSPITAL 10 - OBT | Age: 22
Discharge: HOME | End: 2017-02-21
Attending: OBSTETRICS & GYNECOLOGY
Payer: MEDICAID

## 2017-02-21 VITALS — RESPIRATION RATE: 18 BRPM | DIASTOLIC BLOOD PRESSURE: 75 MMHG | SYSTOLIC BLOOD PRESSURE: 128 MMHG | HEART RATE: 106 BPM

## 2017-02-21 VITALS
WEIGHT: 158.73 LBS | BODY MASS INDEX: 32 KG/M2 | HEIGHT: 59 IN | BODY MASS INDEX: 32 KG/M2 | HEIGHT: 59 IN | WEIGHT: 158.73 LBS

## 2017-02-21 DIAGNOSIS — Z3A.39: ICD-10-CM

## 2017-02-21 DIAGNOSIS — O14.93: Primary | ICD-10-CM

## 2017-02-21 PROCEDURE — G0463 HOSPITAL OUTPT CLINIC VISIT: HCPCS

## 2017-02-21 PROCEDURE — 81003 URINALYSIS AUTO W/O SCOPE: CPT

## 2017-02-21 NOTE — NSTRPT
===================================

NST Information

===================================

Datetime Report Generated by CPN: 02/21/2017 21:19

   

   

===========================

Datetime: 02/20/2017 08:12

===========================

   

   

===================================

NST Information

===================================

   

 EGA:  39.5

 Test Number:  7

 Time on Monitor:  02/20/2017 08:32

 Time off Monitor:  02/20/2017 08:52

 NST Duration (Min):  20

 Reason for NST:  Gestational Hypertension

 Test and Monitor Explained:  Monitor Explained; Test Explained

 Temp :  98.3

 Pulse:  86

 Resp:  16

 SBP:  120

 DBP:  63

   

===================================

Test Evaluation

===================================

   

 NST Interventions:  None

 Patient States Fetal Movement:  Present

 Contraction Frequency:  X1`

 FHR Baseline :  125

 Variability:  Moderate 6-25bpm

 Accelerations:  15X15

 Decelerations:  None

 FHR Category:  Category I

 NST Results:  Reactive





 Comments:  To u/s, CEPHALIC, RUTH 10.5

   0902-Pt home undelivered with labor precautions, has clinic appt tomorrow,  fetal kick count inst
ructions reviewed and follow up NST appt given. States understanding and denies further questions at
 this time.  

   

===================================

Electronically Signed By

===================================

   

 E-Signature:  Electronically signed by Dyan Tafti MD on 2/21/2017 at 21:17  with User ID: [id number]

   

===========================

Datetime: 02/16/2017 08:11

===========================

   

   

===================================

NST Information

===================================

   

 EGA:  39.1

 NST Duration (Min):  42

   

===========================

Datetime: 02/13/2017 08:11

===========================

   

   

===================================

NST Information

===================================

   

 EGA:  38.5

 NST Duration (Min):  26

   

===========================

Datetime: 02/09/2017 08:14

===========================

   

   

===================================

NST Information

===================================

   

 EGA:  38.1

 NST Duration (Min):  26

   

===========================

Datetime: 02/06/2017 08:10

===========================

   

   

===================================

NST Information

===================================

   

 EGA:  37.5

 NST Duration (Min):  33

   

===========================

Datetime: 02/02/2017 08:14

===========================

   

   

===================================

NST Information

===================================

   

 EGA:  37.1

 NST Duration (Min):  24

   

===========================

Datetime: 01/30/2017 08:16

===========================

   

   

===================================

NST Information

===================================

   

 EGA:  36.5

 NST Duration (Min):  40

## 2017-02-21 NOTE — HP
Date/Time of Note


Date/Time of Note


DATE: 2/21/17 


TIME: 22:56





OB - History


Hx of Present Pregnancy


Free Text/Dictation


OB Triage


Pt is a 21yo G1 at 39+6 presenting to OB triage from clinic for evaluation of 

BPs and possible induction of labor. BPs in clinic 129/87 and 133/88.


On review of prenatal records, BP on 7/25/16 135/80.


Pt reports normal FM, denies LOF, VB, UCs, HA, vision changes or RUQ pain.


Prenatal Care:  [facility]





Past Family/Social History


*


Past Medical, Surgical, Family and Obstetric Histories reviewed from prenatal 

chart.





OB  Admission Exam


Vital Signs


Vital Signs





 Vital Signs








  Date Time  Temp Pulse Resp B/P Pulse Ox O2 Delivery O2 Flow Rate FiO2


 


2/21/17 20:50 98.8 106 18 128/75  Room Air  








113-128/65-75





Physical Exam


Fetal Heart Rate:  120's (to 130s)


Accelerations:  Accelerations Present


Decelerations:  No Decelerations


Varibility:  Moderate





OB  Assessment/Plan


Other Assessment:


Normal BPs w/o signs/sxs of Gestational HTN or PreEclampsia


Reassuring FWB


Other plan:


Given normal BPs and negative proteinuria, no further workup indicated


NST reactive and reassuring


Pt appropriate for d/c home w/continued 2x weekly ANTC and weekly clinic visits


IOL scheduled for 41wks GA


Labor, ROM and FKC precautions reviewed











SULAIMAN CONLEY MD Feb 21, 2017 22:57

## 2017-02-21 NOTE — TRIAGE
===================================

OB Triage

===================================

Datetime Report Generated by CPN: 2017 22:32

   

   

===========================

Datetime: 2017 21:11

===========================

   

 Stage of Pregnancy:  OB Triage

 Monitor Mode:  External

 Quality:  Mild

 Pattern:  Normal: <= 5 Contractions in 10 Minutes

 Resting Tone [place]:  Relaxed

   

===================================

Fetal Heart Rate

===================================

   

 FHR Baseline Rate:  130

 Monitor Mode:  External US

 FHR Baseline Changes:  No Baseline Change

 Variability:  Moderate 6-25 bpm

 Accelerations:  15X15

 Decelerations:  None

 Category:  Category I

   

===========================

Datetime: 2017 20:46

===========================

   

 Stage of Pregnancy:  OB Triage

 Monitor Mode:  External

 Quality:  Mild

 Pattern:  Normal: <= 5 Contractions in 10 Minutes

 Resting Tone [place]:  Relaxed

   

===================================

Fetal Heart Rate

===================================

   

 FHR Baseline Rate:  120

 Monitor Mode:  External US

 FHR Baseline Changes:  No Baseline Change

 Variability:  Moderate 6-25 bpm

 Accelerations:  15X15

 Decelerations:  None

 Category:  Category I

   

===========================

Datetime: 2017 20:41

===========================

   

 Time of Arrival:  2017 19:58

 EGA:  39.6

 Arrived By:  Ambulatory

 Arrived From:  Home

 Chief Complaint:   sent from clinic d/t elevated BP

 Fetal Movement:  Present

 Initial Plan:  EFM

   

===========================

Datetime: 2017 20:16

===========================

   

   

===================================

Maternal Assessment

===================================

   

 Level of Consciousness:  Fully Conscious

 DTR's/Clonus:  DTRs 1+

 Headache:  Denies

 Blurred Vision:  No

 Nausea/Vomiting:  Denies

 RUQ Epigastric Pain:  Denies

 Facial Edema:  None

   

===================================

Labor Evaluation

===================================

   

 Frequency:  placed

 Monitor Mode:  External

 Resting Tone [place]:  Relaxed

 Monitor Mode:  External US

 Comments:  

   

===================================

Pain Assessment

===================================

   

 Pain Scale:  0

 Pain Presence:  None/Denies

 Pain Type:  N/A

   

===========================

Datetime: 2017 23:17

===========================

   

 Stage of Pregnancy:  Labor

   

===========================

Datetime: 2017 23:00

===========================

   

 Stage of Pregnancy:  Labor

   

===================================

Maternal Assessment

===================================

   

 Level of Consciousness:  Fully Conscious

   

===================================

Labor Evaluation

===================================

   

 Frequency:  Irregular

 Monitor Mode:  External

 Duration (sec)2399:  40-80

 Quality:  Mild

 Pattern:  Normal: <= 5 Contractions in 10 Minutes

 Resting Tone [place]:  Relaxed

   

===================================

Fetal Heart Rate

===================================

   

 FHR Baseline Rate:  120

 Monitor Mode:  External US

 FHR Baseline Changes:  No Baseline Change

 Variability:  Moderate 6-25 bpm

 Accelerations:  15X15

 Decelerations:  None

 Category:  Category I

   

===========================

Datetime: 2017 22:00

===========================

   

 Stage of Pregnancy:  Labor

   

===================================

Maternal Assessment

===================================

   

 Level of Consciousness:  Fully Conscious

   

===================================

Labor Evaluation

===================================

   

 Frequency:  Irregular

 Monitor Mode:  External

 Duration (sec)2399:  

 Quality:  Mild

 Pattern:  Normal: <= 5 Contractions in 10 Minutes

 Resting Tone [place]:  Relaxed

   

===================================

Fetal Heart Rate

===================================

   

 FHR Baseline Rate:  120

 Monitor Mode:  External US

 FHR Baseline Changes:  No Baseline Change

 Variability:  Moderate 6-25 bpm

 Accelerations:  15X15

 Decelerations:  None

   

===================================

Pain Assessment

===================================

   

 Pain Scale:  5

 Pain Presence:  Intermittent

 Pain Type:  Cramping; Contraction

 Pain Location:  Abdomen; Back

 Pain Goal:  2

 Pain Relief Measures:  Comfort Measures

   

===========================

Datetime: 2017 21:16

===========================

   

   

===================================

Vaginal Exam

===================================

   

 Dilatation (cms):  0.5

 Effacement (%):  50

 Station:  -3

 Exam By:  Dr Pitts

 Membrane Status:  Intact

 Vaginal Bleeding:  None

 Cervix, Consistency:  Soft

 Cervix, Position:  Posterior

 Fetal Presentation 'A':  Cephalic

   

===========================

Datetime: 2017 21:00

===========================

   

 Stage of Pregnancy:  Labor

   

===================================

Maternal Assessment

===================================

   

 Level of Consciousness:  Fully Conscious

   

===================================

Labor Evaluation

===================================

   

 Frequency:  7-10

 Monitor Mode:  External

 Duration (sec)2399:  

 Quality:  Mild

 Pattern:  Normal: <= 5 Contractions in 10 Minutes

 Resting Tone [place]:  Relaxed

   

===================================

Fetal Heart Rate

===================================

   

 FHR Baseline Rate:  120

 Monitor Mode:  External US

 FHR Baseline Changes:  No Baseline Change

 Variability:  Moderate 6-25 bpm

 Accelerations:  15X15

 Decelerations:  None

 Category:  Category I

   

===================================

Pain Assessment

===================================

   

 Pain Scale:  5

 Pain Presence:  Intermittent

 Pain Type:  Cramping; Contraction

 Pain Location:  Abdomen; Back

 Pain Goal:  2

 Pain Relief Measures:  Comfort Measures

 Pain Assessment Comments:  Patient states the pain is still tolerable

   

===========================

Datetime: 2017 20:48

===========================

   

 Headache:  Generalized

   

===========================

Datetime: 2017 20:41

===========================

   

 Assessment Type:  Admission Assessment

 Vaginal Bleeding:  None

   

===================================

Maternal Assessment

===================================

   

 Level of Consciousness:  Fully Conscious

 DTR's/Clonus:  DTRs 2+; No Clonus

 Headache:  Denies

 Blurred Vision:  No

 Respiratory Effort:  Unlabored; Regular Rhythm; Equal Expansion

 Breath Sounds, Left:  Clear and Equal

 Breath Sounds, Right:  Clear and Equal

 Nausea/Vomiting:  Denies

 RUQ Epigastric Pain:  Denies

 Lower Extremities Edema:  None

     Degree:  None

 Upper Extremities Edema:  None

     Degree:  None

 Facial Edema:  None

   

===================================

Fall Risk Assessment

===================================

   

 History of Falling:  (0) No

 Secondary Diagnosis:  (0) No

 Ambulatory Aid:  (0) Bedrest/Nurse Assist

 IV Therapy:  (0) No

 Gait:  (0) Normal/Bedrest/Immobile

 Mental Status:  (0) Oriented to Own Ability

 Fall Score:  0

 Fall Risk Score Definition:  No Risk: No action required

   

===================================

Labor Evaluation

===================================

   

 Frequency:  7-10

 Duration (sec)2399:  

 Quality:  Mild

 Pattern:  Normal: <= 5 Contractions in 10 Minutes

 Resting Tone [place]:  Relaxed

 Contraction Comments:  Patient states the contractions are becoming more regular throughout the day

   

===================================

Fetal Heart Rate

===================================

   

 FHR Baseline Rate:  125

 Variability:  Moderate 6-25 bpm

 Accelerations:  15X15

 Decelerations:  None

 Category:  Category I

   

===================================

Pain Assessment

===================================

   

 Pain Scale:  5

 Pain Presence:  Intermittent

 Pain Type:  Cramping; Contraction

 Pain Location:  Abdomen; Back

 Pain Goal:  2

 Pain Assessment Comments:  Patient states the pain is still tolerable

 Membrane Status:  Intact

   

===========================

Datetime: 2017 20:00

===========================

   

 Stage of Pregnancy:  Labor

   

===================================

Labor Evaluation

===================================

   

 Frequency:  7-15

 Monitor Mode:  External

 Duration (sec)2399:  50-80

 Quality:  Mild

 Pattern:  Normal: <= 5 Contractions in 10 Minutes

 Resting Tone [place]:  Relaxed

   

===================================

Fetal Heart Rate

===================================

   

 FHR Baseline Rate:  120

 Monitor Mode:  External US

 FHR Baseline Changes:  No Baseline Change

 Variability:  Moderate 6-25 bpm

 Accelerations:  15X15

 Decelerations:  None

 Category:  Category I

   

===================================

Pain Assessment

===================================

   

 Pain Scale:  5

 Pain Presence:  Intermittent

 Pain Type:  Cramping; Contraction

 Pain Location:  Abdomen; Back

 Pain Goal:  2

 Pain Relief Measures:  Comfort Measures

 Pain Assessment Comments:  Patient states the pain is still tolerable

   

===========================

Datetime: 2017 19:00

===========================

   

   

===================================

Labor Evaluation

===================================

   

 Frequency:  IRREGULAR

 Monitor Mode:  External

 Duration (sec)2399:  

 Quality:  Mild

 Pattern:  Normal: <= 5 Contractions in 10 Minutes

 Resting Tone [place]:  Relaxed

   

===================================

Fetal Heart Rate

===================================

   

 FHR Baseline Rate:  125

 Monitor Mode:  External US

 FHR Baseline Changes:  No Baseline Change

 Variability:  Moderate 6-25 bpm

 Accelerations:  15X15

 Decelerations:  None

 Category:  Category I

   

===========================

Datetime: 2017 18:00

===========================

   

   

===================================

Labor Evaluation

===================================

   

 Frequency:  IRREGULAR

 Monitor Mode:  External

 Duration (sec)2399:  

 Quality:  Mild

 Pattern:  Normal: <= 5 Contractions in 10 Minutes

 Resting Tone [place]:  Relaxed

   

===================================

Fetal Heart Rate

===================================

   

 FHR Baseline Rate:  120

 Monitor Mode:  External US

 FHR Baseline Changes:  No Baseline Change

 Variability:  Moderate 6-25 bpm

 Accelerations:  15X15

 Decelerations:  None

 Category:  Category I

   

===========================

Datetime: 2017 17:00

===========================

   

   

===================================

Labor Evaluation

===================================

   

 Frequency:  IRREGULAR

 Monitor Mode:  External

 Duration (sec)2399:  

 Quality:  Mild

 Pattern:  Normal: <= 5 Contractions in 10 Minutes

 Resting Tone [place]:  Relaxed

   

===================================

Fetal Heart Rate

===================================

   

 FHR Baseline Rate:  125

 Monitor Mode:  External US

 FHR Baseline Changes:  No Baseline Change

 Variability:  Moderate 6-25 bpm

 Accelerations:  15X15

 Decelerations:  None

 Category:  Category I

   

===========================

Datetime: 2017 16:00

===========================

   

   

===================================

Labor Evaluation

===================================

   

 Frequency:  IRREGULAR

 Monitor Mode:  External

 Duration (sec)2399:  

 Quality:  Mild

 Pattern:  Normal: <= 5 Contractions in 10 Minutes

 Resting Tone [place]:  Relaxed

   

===================================

Fetal Heart Rate

===================================

   

 FHR Baseline Rate:  125

 Monitor Mode:  External US

 FHR Baseline Changes:  No Baseline Change

 Variability:  Moderate 6-25 bpm

 Accelerations:  15X15

 Decelerations:  None

 Category:  Category I

   

===========================

Datetime: 2017 15:00

===========================

   

   

===================================

Labor Evaluation

===================================

   

 Frequency:  OCCAS

 Monitor Mode:  External

 Duration (sec)2399:  

 Quality:  Mild

 Pattern:  Normal: <= 5 Contractions in 10 Minutes

 Resting Tone [place]:  Relaxed

   

===================================

Fetal Heart Rate

===================================

   

 FHR Baseline Rate:  120

 Monitor Mode:  External US

 FHR Baseline Changes:  No Baseline Change

 Variability:  Moderate 6-25 bpm

 Accelerations:  15X15

 Decelerations:  Variable

 Category:  Category II

   

===========================

Datetime: 2017 14:00

===========================

   

   

===================================

Labor Evaluation

===================================

   

 Frequency:  IRREGULAR

 Monitor Mode:  External

 Duration (sec)2399:  

 Quality:  Mild

 Pattern:  Normal: <= 5 Contractions in 10 Minutes

 Resting Tone [place]:  Relaxed

   

===================================

Fetal Heart Rate

===================================

   

 FHR Baseline Rate:  115

 Monitor Mode:  External US

 FHR Baseline Changes:  No Baseline Change

 Variability:  Moderate 6-25 bpm

 Accelerations:  15X15

 Decelerations:  None

 Category:  Category I

   

===========================

Datetime: 2017 13:00

===========================

   

   

===================================

Labor Evaluation

===================================

   

 Frequency:  OCCAS

 Monitor Mode:  External

 Duration (sec)2399:  

 Quality:  Mild

 Pattern:  Normal: <= 5 Contractions in 10 Minutes

 Resting Tone [place]:  Relaxed

   

===================================

Fetal Heart Rate

===================================

   

 FHR Baseline Rate:  115

 Monitor Mode:  External US

 FHR Baseline Changes:  No Baseline Change

 Variability:  Moderate 6-25 bpm

 Accelerations:  15X15

 Decelerations:  None

 Category:  Category I

   

===========================

Datetime: 2017 12:00

===========================

   

   

===================================

Labor Evaluation

===================================

   

 Frequency:  OCCAS

 Monitor Mode:  External

 Duration (sec)2399:  

 Quality:  Mild

 Pattern:  Normal: <= 5 Contractions in 10 Minutes

 Resting Tone [place]:  Relaxed

   

===================================

Fetal Heart Rate

===================================

   

 FHR Baseline Rate:  120

 Monitor Mode:  External US

 FHR Baseline Changes:  No Baseline Change

 Variability:  Moderate 6-25 bpm

 Accelerations:  15X15

 Decelerations:  None

 Category:  Category I

   

===========================

Datetime: 2017 11:02

===========================

   

   

===================================

Vaginal Exam

===================================

   

 Dilatation (cms):  0.0

 Effacement (%):  0

 Station:  -4

 Exam By:  A GHUKASYAN

   

===========================

Datetime: 2017 10:54

===========================

   

   

===================================

Labor Evaluation

===================================

   

 Frequency:  IRREGULAR

 Monitor Mode:  External

 Duration (sec)2399:  40-90

 Quality:  Mild

 Pattern:  Normal: <= 5 Contractions in 10 Minutes

 Resting Tone [place]:  Relaxed

   

===================================

Fetal Heart Rate

===================================

   

 FHR Baseline Rate:  120

 Monitor Mode:  External US

 Variability:  Moderate 6-25 bpm

 Accelerations:  15X15

 Decelerations:  None

 Category:  Category I

   

===========================

Datetime: 2017 10:31

===========================

   

 Assessment Type:  Admission Assessment

   

===================================

Maternal Assessment

===================================

   

 Level of Consciousness:  Fully Conscious

 DTR's/Clonus:  DTRs 2+; No Clonus

 Headache:  Denies

 Blurred Vision:  No

 Respiratory Effort:  Unlabored; Regular Rhythm; Equal Expansion

 Breath Sounds, Left:  Clear and Equal

 Breath Sounds, Right:  Clear and Equal

 Nausea/Vomiting:  Denies

 RUQ Epigastric Pain:  Denies

 Lower Extremities Edema:  None

     Degree:  None

 Upper Extremities Edema:  None

     Degree:  None

 Facial Edema:  None

   

===================================

Fall Risk Assessment

===================================

   

 History of Falling:  (0) No

 Secondary Diagnosis:  (0) No

 Ambulatory Aid:  (0) Bedrest/Nurse Assist

 IV Therapy:  (0) No

 Gait:  (0) Normal/Bedrest/Immobile

 Mental Status:  (0) Oriented to Own Ability

 Fall Score:  0

 Fall Risk Score Definition:  No Risk: No action required

   

===========================

Datetime: 2017 10:29

===========================

   

 Time of Arrival:  2017 10:15

 EGA:  39.1

 Arrived By:  Ambulatory

 Arrived From:  Other Unit in Hospital

 Chief Complaint:  MONITORING 

 Fetal Movement:  Present

 Contractions:  Denies/Absent

 Rupture of Membranes:  Denies

 Vaginal Bleeding:  None

 Vaginal Discharge:  Denies

 Recent Sexual Intercouse:  Denies

 Abdominal Trauma:  Not Applicable

 Patient Complaints:  Other

 Time Provider Notified:  2017 11:20

 Provider Notified:  DR GUNDERSON

 Initial Plan:  NST, BPP

   

===========================

Datetime: 02/15/2017 21:41

===========================

   

 Stage of Pregnancy:  OB Triage

   

===========================

Datetime: 02/15/2017 21:33

===========================

   

 Headache:  Denies

 Blurred Vision:  No

 RUQ Epigastric Pain:  Denies

 Facial Edema:  None

 Monitor Mode:  External

 Quality:  Mild

 Pattern:  Normal: <= 5 Contractions in 10 Minutes

 Resting Tone [place]:  Relaxed

   

===================================

Fetal Heart Rate

===================================

   

 FHR Baseline Rate:  130

 Monitor Mode:  External US

 Variability:  Moderate 6-25 bpm

 Accelerations:  15X15

 Decelerations:  None

 Category:  Category I

   

===================================

Pain Assessment

===================================

   

 Pain Scale:  3

 Pain Presence:  Intermittent

 Pain Type:  Cramping

 Pain Location:  Abdomen

   

===================================

Vaginal Exam

===================================

   

 Dilatation (cms):  0.0

 Effacement (%):  50

 Station:  -2

 Exam By:  E Kenneth

 Membrane Status:  Intact

 Vaginal Bleeding:  None

 Cervix, Consistency:  Moderate

 Cervix, Position:  Posterior

   

===========================

Datetime: 02/15/2017 20:30

===========================

   

   

===================================

Labor Evaluation

===================================

   

 Frequency:  4-8

 Monitor Mode:  External

 Quality:  Mild

 Pattern:  Normal: <= 5 Contractions in 10 Minutes

 Resting Tone [place]:  Relaxed

   

===================================

Fetal Heart Rate

===================================

   

 FHR Baseline Rate:  120

 Monitor Mode:  External US

 FHR Baseline Changes:  No Baseline Change

 Variability:  Moderate 6-25 bpm

 Accelerations:  15X15

 Decelerations:  None

 Category:  Category I

   

===========================

Datetime: 02/15/2017 17:39

===========================

   

 Fall Score:  0

 Fall Risk Score Definition:  No Risk: No action required

   

===========================

Datetime: 02/15/2017 17:37

===========================

   

 EGA:  39.0

   

===========================

Datetime: 2017 20:40

===========================

   

 Fall Score:  0

 Fall Risk Score Definition:  No Risk: No action required

   

===========================

Datetime: 2017 20:15

===========================

   

 EGA:  35.6

   

===========================

Datetime: 2017 19:59

===========================

   

 EGA:  34.6

   

===========================

Datetime: 2017 19:39

===========================

   

 Fall Score:  0

 Fall Risk Score Definition:  No Risk: No action required

## 2017-02-27 ENCOUNTER — HOSPITAL ENCOUNTER (INPATIENT)
Dept: HOSPITAL 10 - OBT | Age: 22
LOS: 6 days | Discharge: HOME | End: 2017-03-05
Attending: OBSTETRICS & GYNECOLOGY | Admitting: OBSTETRICS & GYNECOLOGY
Payer: MEDICAID

## 2017-02-27 VITALS — DIASTOLIC BLOOD PRESSURE: 69 MMHG | SYSTOLIC BLOOD PRESSURE: 132 MMHG | RESPIRATION RATE: 18 BRPM | HEART RATE: 78 BPM

## 2017-02-27 VITALS
HEIGHT: 59 IN | BODY MASS INDEX: 31.56 KG/M2 | WEIGHT: 156.53 LBS | WEIGHT: 156.53 LBS | BODY MASS INDEX: 31.56 KG/M2 | HEIGHT: 59 IN

## 2017-02-27 DIAGNOSIS — Z3A.41: ICD-10-CM

## 2017-02-27 DIAGNOSIS — O48.0: Primary | ICD-10-CM

## 2017-02-27 LAB
ADD SCAN DIFF: NO
APTT BLD: 27.9 SEC (ref 25–35)
BASOPHILS # BLD AUTO: 0 10^3/UL (ref 0–0.1)
BASOPHILS NFR BLD: 0.4 % (ref 0–2)
EOSINOPHIL # BLD: 0 10^3/UL (ref 0–0.5)
EOSINOPHIL NFR BLD: 0.3 % (ref 0–7)
ERYTHROCYTE [DISTWIDTH] IN BLOOD BY AUTOMATED COUNT: 14.1 % (ref 11.5–14.5)
HCT VFR BLD CALC: 35.9 % (ref 37–47)
HGB BLD-MCNC: 12.2 G/DL (ref 12–16)
INR PPP: 0.95
LYMPHOCYTES # BLD AUTO: 2 10^3/UL (ref 0.8–2.9)
LYMPHOCYTES NFR BLD AUTO: 20.5 % (ref 15–51)
MCH RBC QN AUTO: 31.2 PG (ref 29–33)
MCHC RBC AUTO-ENTMCNC: 34 G/DL (ref 32–37)
MCV RBC AUTO: 91.8 FL (ref 82–101)
MONOCYTES # BLD: 0.7 10^3/UL (ref 0.3–0.9)
MONOCYTES NFR BLD: 7.5 % (ref 0–11)
NEUTROPHILS # BLD: 6.6 10^3/UL (ref 1.6–7.5)
NEUTROPHILS NFR BLD AUTO: 69.1 % (ref 39–77)
NRBC # BLD MANUAL: 0 10^3/UL (ref 0–0)
NRBC BLD QL: 0 /100WBC (ref 0–0)
PLATELET # BLD: 277 10^3/UL (ref 140–415)
PMV BLD AUTO: 9.4 FL (ref 7.4–10.4)
PROTHROMBIN TIME: 12.7 SEC (ref 12.2–14.2)
PT RATIO: 1
RBC # BLD AUTO: 3.91 10^6/UL (ref 4.2–5.4)
WBC # BLD AUTO: 9.5 10^3/UL (ref 4.8–10.8)

## 2017-02-27 PROCEDURE — 36415 COLL VENOUS BLD VENIPUNCTURE: CPT

## 2017-02-27 PROCEDURE — 86901 BLOOD TYPING SEROLOGIC RH(D): CPT

## 2017-02-27 PROCEDURE — 84112 EVAL AMNIOTIC FLUID PROTEIN: CPT

## 2017-02-27 PROCEDURE — 86592 SYPHILIS TEST NON-TREP QUAL: CPT

## 2017-02-27 PROCEDURE — 76816 OB US FOLLOW-UP PER FETUS: CPT

## 2017-02-27 PROCEDURE — 90715 TDAP VACCINE 7 YRS/> IM: CPT

## 2017-02-27 PROCEDURE — 62319: CPT

## 2017-02-27 PROCEDURE — 86900 BLOOD TYPING SEROLOGIC ABO: CPT

## 2017-02-27 PROCEDURE — 88307 TISSUE EXAM BY PATHOLOGIST: CPT

## 2017-02-27 PROCEDURE — 76815 OB US LIMITED FETUS(S): CPT

## 2017-02-27 PROCEDURE — 85610 PROTHROMBIN TIME: CPT

## 2017-02-27 PROCEDURE — 85025 COMPLETE CBC W/AUTO DIFF WBC: CPT

## 2017-02-27 PROCEDURE — G0463 HOSPITAL OUTPT CLINIC VISIT: HCPCS

## 2017-02-27 PROCEDURE — 86850 RBC ANTIBODY SCREEN: CPT

## 2017-02-27 PROCEDURE — 85730 THROMBOPLASTIN TIME PARTIAL: CPT

## 2017-02-27 PROCEDURE — 87070 CULTURE OTHR SPECIMN AEROBIC: CPT

## 2017-02-27 PROCEDURE — 87340 HEPATITIS B SURFACE AG IA: CPT

## 2017-02-27 RX ADMIN — PYRIDOXINE HYDROCHLORIDE SCH MLS/HR: 100 INJECTION, SOLUTION INTRAMUSCULAR; INTRAVENOUS at 23:17

## 2017-02-27 NOTE — RADRPT
PROCEDURE:   US biophysical profile.

 

CLINICAL INDICATION:  Concern for leaking. Fetal well-being.

 

TECHNIQUE:   Multiple sonographic images of the pregnant uterus were obtained.  The images were revi
ewed on a PACS workstation. 

 

COMPARISON:   02/16/2017. 

 

FINDINGS:

There is a single live intrauterine gestation.  Fetal heart rate is 156 beats per minute. 

The position is cephalic.

The placenta is right lateral, grade 2. 

The RUTH is 11.9 cm. 

 

Fetal Breathing Movement: 2

Gross Body Movement: 2

Fetal Tone: 2

Qualitative Amniotic Fluid Volume: 2

TOTAL: 8

 

IMPRESSION:

1.  Single viable intrauterine gestation.  

2.  Biophysical profile = 8/8.

3.  RUTH = 11.9 cm. 

 

RPTAT: HFN

_____________________________________________ 

.Lenny Camp MD, MD           Date    Time 

Electronically viewed and signed by .Lenny Camp MD, MD on 02/27/2017 22:52 

 

D:  02/27/2017 22:52  T:  02/27/2017 22:52

.N/

## 2017-02-28 RX ADMIN — PYRIDOXINE HYDROCHLORIDE SCH MLS/HR: 100 INJECTION, SOLUTION INTRAMUSCULAR; INTRAVENOUS at 07:41

## 2017-02-28 RX ADMIN — Medication SCH MCG: at 10:59

## 2017-02-28 RX ADMIN — Medication SCH MCG: at 03:26

## 2017-02-28 RX ADMIN — PYRIDOXINE HYDROCHLORIDE SCH MLS/HR: 100 INJECTION, SOLUTION INTRAMUSCULAR; INTRAVENOUS at 04:03

## 2017-02-28 RX ADMIN — PYRIDOXINE HYDROCHLORIDE SCH MLS/HR: 100 INJECTION, SOLUTION INTRAMUSCULAR; INTRAVENOUS at 08:30

## 2017-02-28 RX ADMIN — BUTORPHANOL TARTRATE PRN MG: 2 INJECTION INTRAMUSCULAR; INTRAVENOUS at 07:34

## 2017-02-28 RX ADMIN — Medication SCH MCG: at 13:00

## 2017-02-28 RX ADMIN — BUTORPHANOL TARTRATE PRN MG: 2 INJECTION INTRAMUSCULAR; INTRAVENOUS at 17:47

## 2017-02-28 RX ADMIN — Medication SCH MCG: at 15:18

## 2017-02-28 RX ADMIN — PYRIDOXINE HYDROCHLORIDE SCH MLS/HR: 100 INJECTION, SOLUTION INTRAMUSCULAR; INTRAVENOUS at 17:35

## 2017-02-28 RX ADMIN — Medication SCH MCG: at 21:00

## 2017-02-28 NOTE — DS
Date/Time of Note


Date/Time of Note


DATE: 2/28/17 


TIME: 21:16





Obstetrical Discharge Record


Final Diagnosis


Final Diagnosis:  Term not delivered


Other Final Diagnosis


Normotensive.





Complications


Other (Normotensive. Not in labor.)


Augmentation:  No


Induction:  No





Condition on Discharge


Physical Assessment


Last Vitals:


T=98.2. /65. Pt was not examined by me. Was seen/evaluated by Dr Woo 

and then admitted for observation only, possible induction, but then pt decided 

to go home and return for follow-up to allow for labor. I have no recollection 

of seeing her or evaluating the tracing, but my name is on the discharge order.


Voiding:  Yes


Bowel Movement:  Yes


Fundus:  Other (Soft, NT, gravid, per Dr Woo.)


Abdomen and Incision:


N/A


Episiotomy:


N/A


Calf Tenderness:  No


Patient Condition:  SUKUMAR Andrews MD Feb 28, 2017 21:22

## 2017-02-28 NOTE — TRIAGE
===================================

OB Triage

===================================

Datetime Report Generated by CPN: 02/28/2017 03:44

   

   

===========================

Datetime: 02/28/2017 02:00

===========================

   

   

===================================

Labor Evaluation

===================================

   

 Frequency:  4-5

 Monitor Mode:  External

 Duration (sec)2399:  

 Quality:  Mild

 Pattern:  Normal: <= 5 Contractions in 10 Minutes

 Resting Tone Triumph:  Relaxed

   

===================================

Fetal Heart Rate

===================================

   

 FHR Baseline Rate:  145

 Monitor Mode:  External US

 FHR Baseline Changes:  No Baseline Change

 Variability:  Moderate 6-25 bpm

 Accelerations:  15X15

 Decelerations:  None

 Category:  Category I

   

===========================

Datetime: 02/28/2017 01:00

===========================

   

   

===================================

Labor Evaluation

===================================

   

 Frequency:  4-5

 Monitor Mode:  External

 Duration (sec)2399:  100-130

 Quality:  Mild

 Pattern:  Normal: <= 5 Contractions in 10 Minutes

 Resting Tone Triumph:  Relaxed

   

===================================

Fetal Heart Rate

===================================

   

 FHR Baseline Rate:  130

 Monitor Mode:  External US

 FHR Baseline Changes:  No Baseline Change

 Variability:  Moderate 6-25 bpm

 Accelerations:  15X15

 Decelerations:  None

 Category:  Category I

   

===========================

Datetime: 02/28/2017 00:00

===========================

   

   

===================================

Labor Evaluation

===================================

   

 Frequency:  irregular

 Monitor Mode:  External

 Quality:  Mild

 Pattern:  Normal: <= 5 Contractions in 10 Minutes

 Resting Tone Triumph:  Relaxed

   

===================================

Fetal Heart Rate

===================================

   

 FHR Baseline Rate:  135

 Monitor Mode:  External US

 FHR Baseline Changes:  No Baseline Change

 Variability:  Moderate 6-25 bpm

 Accelerations:  15X15

 Decelerations:  None

 Category:  Category I

   

===========================

Datetime: 02/27/2017 23:47

===========================

   

 Temperature Route:  Oral

   

===========================

Datetime: 02/27/2017 23:20

===========================

   

   

===================================

Fall Risk Assessment

===================================

   

 History of Falling:  (0) No

 Secondary Diagnosis:  (0) No

 Ambulatory Aid:  (0) Bedrest/Nurse Assist

 IV Therapy:  (20) Yes

 Gait:  (0) Normal/Bedrest/Immobile

 Mental Status:  (0) Oriented to Own Ability

 Fall Score:  20

 Fall Risk Score Definition:  No Risk: No action required

   

===========================

Datetime: 02/27/2017 23:00

===========================

   

   

===================================

Labor Evaluation

===================================

   

 Frequency:  2-4

 Monitor Mode:  External

 Duration (sec)2399:  

 Quality:  Mild

 Pattern:  Normal: <= 5 Contractions in 10 Minutes

 Resting Tone Triumph:  Relaxed

   

===================================

Fetal Heart Rate

===================================

   

 FHR Baseline Rate:  135

 Monitor Mode:  External US

 FHR Baseline Changes:  No Baseline Change

 Variability:  Moderate 6-25 bpm

 Accelerations:  15X15

 Decelerations:  None

 Category:  Category I

   

===========================

Datetime: 02/27/2017 22:18

===========================

   

 Time of Arrival:  02/27/2017 22:15

 EGA:  40.5

 Arrived By:  Ambulatory

 Arrived From:  Home

   

===========================

Datetime: 02/27/2017 22:00

===========================

   

 Stage of Pregnancy:  OB Triage

   

===================================

Labor Evaluation

===================================

   

 Frequency:  4-7

 Monitor Mode:  External

 Duration (sec)2399:  50-90

 Quality:  Mild

 Pattern:  Normal: <= 5 Contractions in 10 Minutes

 Resting Tone Triumph:  Relaxed

   

===================================

Fetal Heart Rate

===================================

   

 FHR Baseline Rate:  150

 Monitor Mode:  External US

 Variability:  Moderate 6-25 bpm

 Accelerations:  15X15

 Decelerations:  Variable

 Category:  Category II

   

===========================

Datetime: 02/27/2017 21:54

===========================

   

   

===================================

Vaginal Exam

===================================

   

 Dilatation (cms):  0.0

 Effacement (%):  60

 Station:  -2

 Exam By:  CARLOS ENRIQUE Cade

 Vaginal Bleeding:  None

 Cervix, Consistency:  Firm

 Cervix, Position:  Posterior

   

===========================

Datetime: 02/27/2017 21:37

===========================

   

 Stage of Pregnancy:  OB Triage

 Assessment Type:  Triage

   

===================================

Maternal Assessment

===================================

   

 Level of Consciousness:  Fully Conscious

 DTR's/Clonus:  DTRs 2+; No Clonus

 Headache:  Denies

 Blurred Vision:  No

 Respiratory Effort:  Unlabored; Regular Rhythm; Equal Expansion

 Breath Sounds, Left:  Clear and Equal

 Breath Sounds, Right:  Clear and Equal

 Nausea/Vomiting:  Denies

 RUQ Epigastric Pain:  Denies

 Facial Edema:  None

 Temperature Route:  Oral

   

===================================

Fall Risk Assessment

===================================

   

 History of Falling:  (0) No

 Secondary Diagnosis:  (0) No

 Ambulatory Aid:  (0) Bedrest/Nurse Assist

 IV Therapy:  (0) No

 Gait:  (0) Normal/Bedrest/Immobile

 Mental Status:  (0) Oriented to Own Ability

 Fall Score:  0

 Fall Risk Score Definition:  No Risk: No action required

   

===================================

Pain Assessment

===================================

   

 Pain Scale:  6

 Pain Presence:  Intermittent

 Pain Type:  Cramping; Contraction

 Pain Location:  Abdomen; Back

 Pain Relief Measures:  Comfort Measures

   

===========================

Datetime: 02/27/2017 21:35

===========================

   

 Monitor Mode:  External

 Contraction Comments:  Triumph applied

 Monitor Mode:  External US

 Comments:  EFM applied

   

===========================

Datetime: 02/27/2017 21:34

===========================

   

 Time of Arrival:  02/27/2017 21:11

 EGA:  40.5

 Arrived By:  Wheelchair

 Arrived From:  Home

 Chief Complaint:  UCs [id number]

   Lost mucus plug this am

 Fetal Movement:  Present

 Contractions:  Regular

 Time Contractions Began:  02/27/2017 20:00

 Contractions:  [id number]

 Rupture of Membranes:  Unsure

 Vaginal Bleeding:  None

 Vaginal Discharge:  Present

 Abdominal Trauma:  Not Applicable

 Patient Complaints:  Contractions; Cramping; Back Pain

 Additional Patient Complaints:  Pt reports hx elevated BPs - goes to [facility] 2x/wk

 Time Provider Notified:  02/27/2017 22:00

 Provider Notified:  

 Initial Plan:  EFM x2, SVE

   

===========================

Datetime: 02/21/2017 22:20

===========================

   

 Stage of Pregnancy:  OB Triage

   

===========================

Datetime: 02/21/2017 20:41

===========================

   

 EGA:  39.6

   

===========================

Datetime: 02/16/2017 20:41

===========================

   

 Fall Score:  0

 Fall Risk Score Definition:  No Risk: No action required

   

===========================

Datetime: 02/16/2017 10:31

===========================

   

 Fall Score:  0

 Fall Risk Score Definition:  No Risk: No action required

   

===========================

Datetime: 02/16/2017 10:29

===========================

   

 EGA:  39.1

   

===========================

Datetime: 02/15/2017 17:39

===========================

   

 Fall Score:  0

 Fall Risk Score Definition:  No Risk: No action required

   

===========================

Datetime: 02/15/2017 17:37

===========================

   

 EGA:  39.0

   

===========================

Datetime: 01/24/2017 20:40

===========================

   

 Fall Score:  0

 Fall Risk Score Definition:  No Risk: No action required

   

===========================

Datetime: 01/24/2017 20:15

===========================

   

 EGA:  35.6

   

===========================

Datetime: 01/17/2017 19:59

===========================

   

 EGA:  34.6

   

===========================

Datetime: 01/17/2017 19:39

===========================

   

 Fall Score:  0

 Fall Risk Score Definition:  No Risk: No action required

## 2017-02-28 NOTE — HP
Date/Time of Note


Date/Time of Note


DATE: 17 


TIME: 02:51





OB - History


Hx of Present Pregnancy


Free Text/Dictation


22 Year-old G1 with SIUP at 40 5/7 wks presents with a chief complaint of 

irregular uterine contractions. She has been receiving her prenatal care with 

[facility]/Dr. Hallman.  She states good fetal movement.  She denies nausea, vomiting

, shortness of breath, chest pain, and abdominal pain between contractions, 

headache, visual changes, vaginal bleeding or LOF.   Her BPs were 142/90 x2 

during her last two prenatal visit at [facility], referred to [facility] at that time which 

had nml BPs here.


Chief Complaint:  irregular uterine contractions


Estimated Due Date:  2017


:  1


Prenatal Care:  [facility] Care


Ultrasounds:  Normal mid trimester US


Obstetrical Complications:  None


Medical Complications:  None





Past Family/Social History


*


Past Medical, Surgical, Family and Obstetric Histories reviewed from prenatal 

chart.





OB  Admission Exam


Vital Signs


Vital Signs





 Vital Signs








  Date Time  Temp Pulse Resp B/P Pulse Ox O2 Delivery O2 Flow Rate FiO2


 


17 21:39 98.2 78 18 132/69  Room Air  











Physical Exam


HEENT:  WNL


Lungs:  Clear


Abdomen:  WNL


Extremities:  Normal


Cervical Dilatation:  None


Effacement:  0%


Station:  -3


Membranes:  Intact


Fetal Heart Rate:  140's


Accelerations:  Accelerations Present


Decelerations:  No Decelerations


Varibility:  Moderate


Contractions on Admission:  6-10 Minutes Apart


Intensity:  Mild


Last 72 hours Lab Results


 CBC & BMP


17 22:54











OB  Assessment/Plan


Other plan:


22 Year-old G1 with GHTN at 40 5/7 wks 


- FHR: No sign of fetal metabolic acidosis- Category I


- Continious EFM, toco


- CBC, blood type and screen


- Analgesia options with R/B/A discussed in detail with patient


- Epidural per patient request


- Please see the orders


- O+/Rubella: Immune/GBS negative/HBS ag: neg





Admission, procedures, expectations, risks and possible complications have been 

discussed in detail with the patient. Risk of vaginal delivery including but 

not limited to bleeding, infection, cervical laceration, placental retention, 

injury to fetus, blood transfusion, blood transfusion related infection, risk 

of anesthesia, adhesion, cervical laceration, episiotomy/laceration, possible 

 delivery with risk of bleeding, infection, injury to other organs  (

bowel, bladder, ureter, vessels, nerves), injury to fetus, blood transfusion, 

blood transfusion related infection, risk of anesthesia, scar and hernia 

formation, needs for future , removal of uterus or any other indicated 

surgery discussed with the patient. She expressed understanding and repeats the 

risks. All of her questions were answered; all appropriate consents will be 

signed.





PHYSICIAN'S VERIFICATION OF INFORMED CONSENT:


The patient was counseled regarding the procedure, its indications, risks, 

potential complications and alternatives and any questions were answered. 

Consent was obtained.





PLANNED PROCEDURE/TREATMENT:  Vaginal delivery with possible vacuum/forceps 

delivery episiotomy, repair of laceration possible  delivery





PHYSICIAN'S VERIFICATION OF INFORMED CONSENT FOR BLOOD TRANSFUSION:


There is a reasonable possibility that blood transfusion will be necessary as a 

result of the patient's procedure.  I have discussed the following with the 

patient/patient's legal representative:  An explanation of the benefits and 

risks of the transfusion of blood or blood products and the possible 

alternatives.  Al questions have been answered to the patient's/patients legal 

representatives satisfaction.


INFORMED CONSENT: The patient has been informed of:


- The nature of the proposed care, treatment, services, medic- Potential 

benefits, risks or side effects, including potential problems related to 

recuperation.


- The likelihood of achieving care treatment and service goals.


- Reasonable alternatives to the proposed care, treatment and service.


- The relevant risks, benefits and side effects related to alternatives, 

including the possible results of not receiving care, treatment and services.


- When indicated, any limitations on the confidentiality of information learned 

from or about the patient.


- If appropriate, the risks, benefits and alternatives of the drugs to be used 

for sedation/analgesia including moderate sedation.


- If appropriate, patient has been provided information on the risks, benefits 

and alternatives to the transfusion of blood and/or blood products.











PAOLA GUERRA 2017 03:00

## 2017-02-28 NOTE — RADRPT
PROCEDURE:   US OB 

 

CLINICAL INDICATION:   41wks

 

TECHNIQUE:   Multiple sonographic images of the pelvis were obtained.  The images were reviewed on a
 PACS workstation. 

 

COMPARISON:   Obstetrical ultrasound from 02/27/2017

 

FINDINGS:

The cervix is not well visualized.

There is a single viable intrauterine gestation.  

Cardiac activity is present with 0.24 beats per minute. 

There is a vertex presentation. 

 

The placenta is posterior. There is no evidence for an abruption or placenta previa.

There is a subjectively normal amount of amniotic fluid.

 

Measurements were made in order to determine fetal age. The results are as follows (cm):

BPD =9.21

HC   =32.59

AC   =33.50

FL    =7.06

Estimated gestational age by ultrasound of approximately 37 weeks, 0 days.  

The estimated date of delivery by ultrasound is 03/21/2017.  

Reported gestational age by LMP of approximately 40 weeks, 6 days.

The reported date of delivery by LMP was 02/22/2017.

 

EFW = 3107  grams (8th percentile)

 

 

IMPRESSION:

Single viable intrauterine gestation of approximately 37 weeks, 0 days .  

The estimated date of delivery is 03/21/2017 .

Dating by ultrasound is not consistent with dating by LMP, as above.

 

Estimated fetal weight in the 8th percentile.

 

Cephalic presentation.

 

 

RPTAT: EE

_____________________________________________ 

Physician Jazlyn           Date    Time 

Electronically viewed and signed by Physician Jazlyn on 02/28/2017 10:50 

 

D:  02/28/2017 10:50  T:  02/28/2017 10:50

RAVI/

## 2017-03-01 RX ADMIN — FENTANYL CIT 0.2 MG/100ML-ROPIV 0.2%-NACL 0.9% EPIDURAL INJ SCH ML: 2/0.2 SOLUTION at 19:17

## 2017-03-01 RX ADMIN — PYRIDOXINE HYDROCHLORIDE SCH MLS/HR: 100 INJECTION, SOLUTION INTRAMUSCULAR; INTRAVENOUS at 15:39

## 2017-03-01 RX ADMIN — FENTANYL CIT 0.2 MG/100ML-ROPIV 0.2%-NACL 0.9% EPIDURAL INJ SCH ML: 2/0.2 SOLUTION at 12:57

## 2017-03-01 RX ADMIN — FENTANYL CIT 0.2 MG/100ML-ROPIV 0.2%-NACL 0.9% EPIDURAL INJ SCH ML: 2/0.2 SOLUTION at 06:48

## 2017-03-01 RX ADMIN — PYRIDOXINE HYDROCHLORIDE SCH MLS/HR: 100 INJECTION, SOLUTION INTRAMUSCULAR; INTRAVENOUS at 20:25

## 2017-03-01 RX ADMIN — Medication SCH MCG: at 01:00

## 2017-03-01 RX ADMIN — PYRIDOXINE HYDROCHLORIDE SCH MLS/HR: 100 INJECTION, SOLUTION INTRAMUSCULAR; INTRAVENOUS at 06:50

## 2017-03-02 VITALS — SYSTOLIC BLOOD PRESSURE: 138 MMHG | HEART RATE: 102 BPM | RESPIRATION RATE: 20 BRPM | DIASTOLIC BLOOD PRESSURE: 84 MMHG

## 2017-03-02 VITALS — DIASTOLIC BLOOD PRESSURE: 80 MMHG | RESPIRATION RATE: 20 BRPM | SYSTOLIC BLOOD PRESSURE: 138 MMHG | HEART RATE: 106 BPM

## 2017-03-02 VITALS — DIASTOLIC BLOOD PRESSURE: 75 MMHG | RESPIRATION RATE: 18 BRPM | SYSTOLIC BLOOD PRESSURE: 125 MMHG | HEART RATE: 104 BPM

## 2017-03-02 LAB
ABNORMAL IP MESSAGE: 1
ADD SCAN DIFF: NO
APTT BLD: 28.8 SEC (ref 25–35)
BASOPHILS # BLD AUTO: 0 10^3/UL (ref 0–0.1)
BASOPHILS NFR BLD: 0.2 % (ref 0–2)
EOSINOPHIL # BLD: 0 10^3/UL (ref 0–0.5)
EOSINOPHIL NFR BLD: 0 % (ref 0–7)
ERYTHROCYTE [DISTWIDTH] IN BLOOD BY AUTOMATED COUNT: 14.5 % (ref 11.5–14.5)
HCT VFR BLD CALC: 34.7 % (ref 37–47)
HGB BLD-MCNC: 11.7 G/DL (ref 12–16)
INR PPP: 0.97
LYMPHOCYTES # BLD AUTO: 1.2 10^3/UL (ref 0.8–2.9)
LYMPHOCYTES NFR BLD AUTO: 5.5 % (ref 15–51)
MCH RBC QN AUTO: 31.8 PG (ref 29–33)
MCHC RBC AUTO-ENTMCNC: 33.7 G/DL (ref 32–37)
MCV RBC AUTO: 94.3 FL (ref 82–101)
MONOCYTES # BLD: 1.8 10^3/UL (ref 0.3–0.9)
MONOCYTES NFR BLD: 8.3 % (ref 0–11)
NEUTROPHILS # BLD: 18.4 10^3/UL (ref 1.6–7.5)
NEUTROPHILS NFR BLD AUTO: 84.7 % (ref 39–77)
NRBC # BLD MANUAL: 0 10^3/UL (ref 0–0)
NRBC BLD QL: 0 /100WBC (ref 0–0)
PLATELET # BLD: 235 10^3/UL (ref 140–415)
PMV BLD AUTO: 9.2 FL (ref 7.4–10.4)
PROTHROMBIN TIME: 12.9 SEC (ref 12.2–14.2)
PT RATIO: 1
RBC # BLD AUTO: 3.68 10^6/UL (ref 4.2–5.4)
WBC # BLD AUTO: 21.8 10^3/UL (ref 4.8–10.8)

## 2017-03-02 RX ADMIN — Medication SCH MLS/HR: at 21:58

## 2017-03-02 RX ADMIN — AMPICILLIN SODIUM SCH MLS/HR: 1 INJECTION, POWDER, FOR SOLUTION INTRAMUSCULAR; INTRAVENOUS at 18:47

## 2017-03-02 RX ADMIN — FENTANYL CIT 0.2 MG/100ML-ROPIV 0.2%-NACL 0.9% EPIDURAL INJ SCH ML: 2/0.2 SOLUTION at 07:26

## 2017-03-02 RX ADMIN — PYRIDOXINE HYDROCHLORIDE SCH MLS/HR: 100 INJECTION, SOLUTION INTRAMUSCULAR; INTRAVENOUS at 08:04

## 2017-03-02 RX ADMIN — AMPICILLIN SODIUM SCH MLS/HR: 1 INJECTION, POWDER, FOR SOLUTION INTRAMUSCULAR; INTRAVENOUS at 23:38

## 2017-03-02 RX ADMIN — PYRIDOXINE HYDROCHLORIDE SCH MLS/HR: 100 INJECTION, SOLUTION INTRAMUSCULAR; INTRAVENOUS at 02:40

## 2017-03-02 RX ADMIN — AMPICILLIN SODIUM SCH MLS/HR: 1 INJECTION, POWDER, FOR SOLUTION INTRAMUSCULAR; INTRAVENOUS at 18:00

## 2017-03-02 RX ADMIN — IBUPROFEN SCH MG: 600 TABLET ORAL at 23:40

## 2017-03-02 RX ADMIN — AMPICILLIN SODIUM SCH MLS/HR: 1 INJECTION, POWDER, FOR SOLUTION INTRAMUSCULAR; INTRAVENOUS at 07:00

## 2017-03-02 RX ADMIN — Medication SCH MLS/HR: at 17:36

## 2017-03-02 RX ADMIN — GENTAMICIN SULFATE SCH MLS/HR: 80 INJECTION, SOLUTION INTRAVENOUS at 08:32

## 2017-03-02 RX ADMIN — IBUPROFEN SCH MG: 600 TABLET ORAL at 18:00

## 2017-03-02 RX ADMIN — Medication SCH MLS/HR: at 12:23

## 2017-03-02 RX ADMIN — Medication SCH MLS/HR: at 16:09

## 2017-03-02 RX ADMIN — GENTAMICIN SULFATE SCH MLS/HR: 80 INJECTION, SOLUTION INTRAVENOUS at 17:05

## 2017-03-02 RX ADMIN — FENTANYL CIT 0.2 MG/100ML-ROPIV 0.2%-NACL 0.9% EPIDURAL INJ SCH ML: 2/0.2 SOLUTION at 00:35

## 2017-03-02 RX ADMIN — GENTAMICIN SULFATE SCH MLS/HR: 80 INJECTION, SOLUTION INTRAVENOUS at 00:35

## 2017-03-02 NOTE — OPR
DATE OF OPERATION:  2017

 

 

PREOPERATIVE DIAGNOSES:

1.  Intrauterine pregnancy at 41 weeks 2 days' gestation.

2.  Meconium stained amniotic fluid.

3.  The patient declined further trial of labor, requested a  delivery.

 

POSTOPERATIVE DIAGNOSES

1.  Intrauterine pregnancy at 41 weeks 2 days' gestation.

2.  Meconium stained amniotic fluid.

3.  The patient declined further trial of labor, requested a  delivery.

 

PROCEDURE:  Primary transverse low cervical  section.

 

SURGEON:  Johana Hallman MD

 

ASSISTANT:  Dr. Patrick Woo.

 

ANESTHESIA:  Epidural.

 

ANESTHESIOLOGIST:  Dr. Wilkins.

 

FINDINGS:  Live baby boy, Apgar 8 and 9, with meconium stained cord and amniotic fluid.

 

DETAILS OF THE PROCEDURE:  Under satisfactory spinal anesthesia, the patient was prepped and draped 
and placed in supine position, tilted to the left.  Pfannenstiel incision was made, carried through 
the subcutaneous tissue.  Bleeders brought under control with electrocautery.  Fascia incised to the
 length of the incision.  Rectus muscle divided in midline.  Peritoneum exposed, entered through a t
ransverse incision.  Exploration of abdomen.  Gravid uterus, normal appearing tubes and ovaries, and
 evidence of labor with _____ amount of the peritoneal fluid.  Bladder flap was developed.  Transver
se incision was made in the lower segment of the uterus.  Amniotic sac ruptured.  Thick meconium sta
ined amniotic fluid noted.  

 

Live baby boy was delivered from unengaged vertex with a nuchal cord tight around the baby's neck.  
Nasal oropharyngeal suction was performed.  Baby handed to the  team for immediate attention
.  Patient received 20 units of Pitocin.  Placenta delivered manually inspected.  It was complete, b
ut meconium stained and sent to pathology.  Uterine cavity cleaned with wet sponge and drainage esta
blished.  Uterus closed in 2 layers using Monocryl #1 in continuous fashion.  Peritoneal cavity irri
gated with copious amount of sterile water.  Sponge, needle and instrument reported to be correct.  
Abdominal peritoneum closed with 2-0 chromic catgut continuously.  Rectus muscle approximated with 2
 interrupted 2-0 chromic catgut.  Fascia closed with #1 PDS in a continuous fashion.  Subcutaneous t
issue approximated with 2-0 chromic catgut and the skin closed with staples.  Estimated blood loss 6
00 mL to 700 mL.  Urine bag contained 200 mL of clear urine.  The patient tolerated procedure well, 
transferred to recovery room in a good condition.

 

 

Dictated By: JOHANA GODINEZ/AVI

DD:    2017 11:40:42

DT:    2017 11:59:54

Conf#: [id number]

DID#:  [id number]

## 2017-03-02 NOTE — HP
DATE OF ADMISSION: 2017

 

HISTORY OF PRESENT ILLNESS:  This is a 22-year-old  female,  1, para 0, with an EDC o
f 2017, admitted to [facility] at 41 weeks and 1 day on 2017 at 2200 i
n labor.  

 

Admission pelvic examination: Cervical dilatation at 3 cm, 90% effaced, vertex at -2 station.  The p
atient admitted into the labor and delivery room for labor.  During the course of labor the patient 
had a spontaneous rupture of membrane at 1421 on the , which at the beginning it seemed to be cl
ear, but later on turned to the meconium stained color.  Patient also had some low grade fever and e
levated WBC, which was placed on the gentamicin and ampicillin by the laborist on call.  She continu
ed to labor; however, did not progress further than 4 to 5 cm and the patient declined further trial
 of labor with the suggestion of amniofusion due to the meconium stained amniotic fluid, requested a
  section delivery, mostly by the recommendation and suggestion of her mother.  Pros and con
s regarding the  and complication may arise from this procedure was discussed with the ashok
ent and she would like to proceed with the operative delivery.  She is being prepared for the above-
mentioned surgery.  This patient has been under the care of the [facility], 
and her pregnancy course was not complicated with gestational diabetes, pregnancy-induced hypertensi
on, or any other serious medical or surgical condition.

 

GYNECOLOGIC HISTORY:  Menarche at age 11, regular period every 28 days, lasting 4 or 5 days.

 

PAST MEDICAL HISTORY:  No history of hospitalization for any surgical or medical condition.

 

ALLERGIES:  DENIES ALLERGIES TO ANY KNOWN MEDICATION.

 

SOCIAL HISTORY:  Denies smoking or drinking.

 

FAMILY HISTORY:  Negative for diabetes or hypertension or any other history of familial disease.

 

REVIEW OF SYSTEMS:  Within normal.

 

PHYSICAL EXAMINATION:

VITAL SIGNS:  Temperature 99, pulse 102, respirations 16, blood pressure 151/88.

HEAD, EARS, NOSE AND THROAT: Negative.

NECK:  Supple.  No thyromegaly.  

LUNGS:  Clear to P and A.

HEART:  Normal sinus rhythm, slightly tachycardic, no murmur.

ABDOMEN:  Fundal height measures 37 cm from symphysis pubis with a fetal heart rate category 1, cont
raction every 3 to 4 minutes.

PELVIC:  Cervical dilatation for 4 to 5 cm, 100% effaced, vertex at -2 station.  It was noted during
 the examination, the patient has sharp angled pubic arch.

EXTREMITIES:  No edema, no varicosities.

 

IMPRESSION: 

1.  Intrauterine pregnancy at 41 weeks plus gestation.  

2.  Meconium stained amniotic fluid.  

 

Patient declined further trial of labor, requests for  section delivery.  Complication of th
e , including bowel or bladder injury, infection, hemorrhage, hematoma, wound infection dis
cussed and the patient is willing to go ahead with this procedure.

 

 

Dictated By: JOHANA GUNDERSON MD

 

HF/NTS

DD:    2017 11:37:14

DT:    2017 12:12:11

Conf#: [id number]

DID#:  [id number]

## 2017-03-03 VITALS — RESPIRATION RATE: 18 BRPM | DIASTOLIC BLOOD PRESSURE: 68 MMHG | HEART RATE: 73 BPM | SYSTOLIC BLOOD PRESSURE: 115 MMHG

## 2017-03-03 VITALS — HEART RATE: 110 BPM | DIASTOLIC BLOOD PRESSURE: 77 MMHG | SYSTOLIC BLOOD PRESSURE: 120 MMHG | RESPIRATION RATE: 18 BRPM

## 2017-03-03 VITALS — RESPIRATION RATE: 19 BRPM | SYSTOLIC BLOOD PRESSURE: 113 MMHG | HEART RATE: 89 BPM | DIASTOLIC BLOOD PRESSURE: 75 MMHG

## 2017-03-03 VITALS — RESPIRATION RATE: 18 BRPM | HEART RATE: 105 BPM | DIASTOLIC BLOOD PRESSURE: 72 MMHG | SYSTOLIC BLOOD PRESSURE: 118 MMHG

## 2017-03-03 VITALS — HEART RATE: 75 BPM | DIASTOLIC BLOOD PRESSURE: 72 MMHG | SYSTOLIC BLOOD PRESSURE: 118 MMHG | RESPIRATION RATE: 18 BRPM

## 2017-03-03 VITALS — HEART RATE: 87 BPM | SYSTOLIC BLOOD PRESSURE: 115 MMHG | RESPIRATION RATE: 19 BRPM | DIASTOLIC BLOOD PRESSURE: 79 MMHG

## 2017-03-03 LAB
ADD SCAN DIFF: NO
BASOPHILS # BLD AUTO: 0 10^3/UL (ref 0–0.1)
BASOPHILS NFR BLD: 0.2 % (ref 0–2)
EOSINOPHIL # BLD: 0 10^3/UL (ref 0–0.5)
EOSINOPHIL NFR BLD: 0.1 % (ref 0–7)
ERYTHROCYTE [DISTWIDTH] IN BLOOD BY AUTOMATED COUNT: 14.6 % (ref 11.5–14.5)
HCT VFR BLD CALC: 30.8 % (ref 37–47)
HGB BLD-MCNC: 10 G/DL (ref 12–16)
LYMPHOCYTES # BLD AUTO: 1.8 10^3/UL (ref 0.8–2.9)
LYMPHOCYTES NFR BLD AUTO: 12.4 % (ref 15–51)
MCH RBC QN AUTO: 30.7 PG (ref 29–33)
MCHC RBC AUTO-ENTMCNC: 32.5 G/DL (ref 32–37)
MCV RBC AUTO: 94.5 FL (ref 82–101)
MONOCYTES # BLD: 1 10^3/UL (ref 0.3–0.9)
MONOCYTES NFR BLD: 6.9 % (ref 0–11)
NEUTROPHILS # BLD: 11.5 10^3/UL (ref 1.6–7.5)
NEUTROPHILS NFR BLD AUTO: 79.5 % (ref 39–77)
NRBC # BLD MANUAL: 0 10^3/UL (ref 0–0)
NRBC BLD QL: 0 /100WBC (ref 0–0)
PLATELET # BLD: 205 10^3/UL (ref 140–415)
PMV BLD AUTO: 9.5 FL (ref 7.4–10.4)
RBC # BLD AUTO: 3.26 10^6/UL (ref 4.2–5.4)
WBC # BLD AUTO: 14.4 10^3/UL (ref 4.8–10.8)

## 2017-03-03 RX ADMIN — AMPICILLIN SODIUM SCH MLS/HR: 1 INJECTION, POWDER, FOR SOLUTION INTRAMUSCULAR; INTRAVENOUS at 17:26

## 2017-03-03 RX ADMIN — Medication SCH MLS/HR: at 08:56

## 2017-03-03 RX ADMIN — PYRIDOXINE HYDROCHLORIDE SCH MLS/HR: 100 INJECTION, SOLUTION INTRAMUSCULAR; INTRAVENOUS at 04:30

## 2017-03-03 RX ADMIN — Medication SCH MLS/HR: at 03:58

## 2017-03-03 RX ADMIN — Medication SCH MLS/HR: at 20:56

## 2017-03-03 RX ADMIN — PYRIDOXINE HYDROCHLORIDE SCH MLS/HR: 100 INJECTION, SOLUTION INTRAMUSCULAR; INTRAVENOUS at 12:30

## 2017-03-03 RX ADMIN — GENTAMICIN SULFATE SCH MLS/HR: 80 INJECTION, SOLUTION INTRAVENOUS at 09:43

## 2017-03-03 RX ADMIN — KETOROLAC TROMETHAMINE PRN MG: 30 INJECTION, SOLUTION INTRAMUSCULAR at 10:27

## 2017-03-03 RX ADMIN — GENTAMICIN SULFATE SCH MLS/HR: 80 INJECTION, SOLUTION INTRAVENOUS at 16:47

## 2017-03-03 RX ADMIN — Medication SCH MLS/HR: at 16:47

## 2017-03-03 RX ADMIN — Medication SCH MLS/HR: at 12:56

## 2017-03-03 RX ADMIN — DOCUSATE SODIUM AND SENNOSIDES SCH TAB: 8.6; 5 TABLET, FILM COATED ORAL at 21:11

## 2017-03-03 RX ADMIN — PYRIDOXINE HYDROCHLORIDE SCH MLS/HR: 100 INJECTION, SOLUTION INTRAMUSCULAR; INTRAVENOUS at 20:30

## 2017-03-03 RX ADMIN — Medication SCH MLS/HR: at 04:56

## 2017-03-03 RX ADMIN — PYRIDOXINE HYDROCHLORIDE SCH MLS/HR: 100 INJECTION, SOLUTION INTRAMUSCULAR; INTRAVENOUS at 10:27

## 2017-03-03 RX ADMIN — AMPICILLIN SODIUM SCH MLS/HR: 1 INJECTION, POWDER, FOR SOLUTION INTRAMUSCULAR; INTRAVENOUS at 05:35

## 2017-03-03 RX ADMIN — AMPICILLIN SODIUM SCH MLS/HR: 1 INJECTION, POWDER, FOR SOLUTION INTRAMUSCULAR; INTRAVENOUS at 11:44

## 2017-03-03 RX ADMIN — GENTAMICIN SULFATE SCH MLS/HR: 80 INJECTION, SOLUTION INTRAVENOUS at 00:16

## 2017-03-03 RX ADMIN — AMPICILLIN SODIUM SCH MLS/HR: 1 INJECTION, POWDER, FOR SOLUTION INTRAMUSCULAR; INTRAVENOUS at 23:45

## 2017-03-03 RX ADMIN — KETOROLAC TROMETHAMINE PRN MG: 30 INJECTION, SOLUTION INTRAMUSCULAR at 03:54

## 2017-03-03 RX ADMIN — IBUPROFEN SCH MG: 600 TABLET ORAL at 17:25

## 2017-03-03 RX ADMIN — IBUPROFEN SCH MG: 600 TABLET ORAL at 05:59

## 2017-03-03 RX ADMIN — IBUPROFEN SCH MG: 600 TABLET ORAL at 11:44

## 2017-03-03 RX ADMIN — DOCUSATE SODIUM AND SENNOSIDES SCH TAB: 8.6; 5 TABLET, FILM COATED ORAL at 09:42

## 2017-03-03 NOTE — PN
Date/Time of Note


Date/Time of Note


DATE: 3/3/17 


TIME: 10:30





OB Subjective


Subjective


Subjective


Post  day 1


Afebrile vital signs are stable, abdomen is uterus firm incision lochia 

moderate extremities normal


 Laboratory Tests








Test


  3/3/17


09:10


 


Basophils # 0.010^3/ul 


 


Basophils % 0.2% 


 


Eosinophils # 0.010^3/ul 


 


Eosinophils % 0.1% 


 


Hematocrit 30.8% 


 


Hemoglobin 10.0g/dl 


 


Lymphocytes # 1.810^3/ul 


 


Lymphocytes % 12.4% 


 


Mean Corpuscular Hemoglobin 30.7pg 


 


Mean Corpuscular Hemoglobin


Concent 32.5g/dl 


 


 


Mean Corpuscular Volume 94.5fl 


 


Mean Platelet Volume 9.5fl 


 


Monocytes # 1.010^3/ul 


 


Monocytes % 6.9% 


 


Neutrophils # 11.510^3/ul 


 


Neutrophils % 79.5% 


 


Nucleated Red Blood Cells # 0.010^3/ul 


 


Nucleated Red Blood Cells % 0.0/100WBC 


 


Platelet Count 96643^3/UL 


 


Red Blood Count 3.2610^6/ul 


 


Red Cell Distribution Width 14.6% 


 


White Blood Count 14.410^3/ul 








 Current Medications








 Medications


  (Trade)  Dose


 Ordered  Sig/Dusty


 Route


 PRN Reason  Start Time


 Stop Time Status Last Admin


Dose Admin


 


 Lactated Ringer's


  (Lr)  1,000 ml @ 


 125 mls/hr  Q8H


 IV


   17 22:14


 3/2/17 17:00 DC 3/2/17 08:04


 


 


 Misoprostol


  (Cytotec 25 Mcg


 Capsule)  25 mcg  Q4


 PO


   17 22:30


 17 03:25 DC 17 23:18


 


 


 Butorphanol


 Tartrate


  (Stadol)  2 mg  Q2H  PRN


 IV


 PAIN  17 22:30


 3/2/17 17:00 DC 17 17:47


 


 


 Lidocaine 30 ml  30 ml  ONCE PRN


 INJ


 EPISIOTOMY/TEARING  17 22:30


 3/2/17 17:00 DC  


 


 


 Oxytocin/Lactated


 Ringer's  500 ml @ 


 125 mls/hr  ONCE -MAY REPEAT X1


 IV


   17 22:30


 3/2/17 17:00 DC  


 


 


 Oxytocin/Lactated


 Ringer's  500 ml @ 


 125 mls/hr  ONCE


 IV


   17 22:30


 3/2/17 17:00 DC 3/2/17 16:09


 


 


 Ibuprofen 600 mg  600 mg  ONCE PRN


 PO


 Mild Pain (Pain Score 1-3)  17 22:30


 3/2/17 17:01 DC  


 


 


 Lactated Ringer's  1,000 ml @ 


 2,000 mls/hr  Q30M PRN


 IV


 PRE-EPIDURAL BOLUS  17 22:30


 3/2/17 17:01 DC  


 


 


 Oxytocin/Lactated


 Ringer's  500 ml @ 0


 mls/hr  ONCE PRN


 IV


 For Hemorrhage Management  17 22:30


 3/2/17 17:01 DC  


 


 


 Methylergonovine


 Maleate


  (Methergine)  0.2 mg  ONCE PRN


 IM


 VAGINAL BLEEDING  17 22:30


 3/2/17 17:01 DC  


 


 


 Carboprost


 Tromethamine


  (Hemabate)  250 mcg  ONCE PRN


 IM


 VAGINAL BLEEDING  17 22:30


 3/2/17 17:01 DC  


 


 


 Misoprostol


  (Cytotec)  1,000 mcg  ONCE PRN


 NE


 VAGINAL BLEEDING  17 22:30


 3/2/17 17:01 DC  


 


 


 Misoprostol 50 mcg  50 mcg  Q4


 PO


   17 05:00


 3/1/17 01:48 DC 17 15:18


 


 


 Oxytocin/Lactated


 Ringer's  500 ml @ 0


 mls/hr  Q0M


 IV


   17 22:30


 3/2/17 17:00 DC 17 23:46


 


 


 Lactated Ringer's


  (Lr)  1,000 ml @ 


 1,000 mls/hr  Q1H ONCE


 IV


   17 22:18


 17 23:17 DC 17 22:36


 


 


 Ondansetron HCl


  (Zofran Inj)  4 mg  pre-procedure ONCE


 IV


   17 22:30


 17 22:31 DC 17 22:57


 


 


 Citric Acid/


 Sodium Citrate


  (Bicitra)  30 ml  pre-procedure ONCE


 PO


   17 22:30


 17 22:31 DC 17 22:46


 


 


 Naloxone HCl


  (Narcan)  0.1 mg  Q2M  PRN


 IV


 FOR RESP RATE 8 OR LESS  17 22:30


 3/1/17 22:29 DC  


 


 


 Ketorolac


 Tromethamine


  (Toradol)  30 mg  Q6H  PRN


 IV


 PAIN  17 22:30


 3/1/17 22:29 DC  


 


 


 Morphine Sulfate


  (morphine)  2 mg  Q3H  PRN


 IV


 PAIN LEVEL 1-5  17 22:30


 3/1/17 22:29 DC  


 


 


 Morphine Sulfate


  (morphine)  4 mg  Q3H  PRN


 IV


 PAIN LEVEL 6-10  17 22:30


 3/1/17 22:29 DC  


 


 


 Diphenhydramine


 HCl


  (Benadryl)  25 mg  Q6H  PRN


 IV


 ITCHING  17 22:30


 3/1/17 22:29 DC  


 


 


 Ondansetron HCl


  (Zofran Inj)  4 mg  Q6H  PRN


 IV


 NAUSEA AND/OR VOMITING  17 22:30


 3/1/17 22:29 DC 3/1/17 19:19


 


 


 Prochlorperazine


  (Compazine Inj)  10 mg  ONCE PRN


 IV


 NAUSEA AND/OR VOMITING  17 22:30


 3/1/17 22:29 DC  


 


 


 Fentanyl/


 Ropivacaine  100 ml  EPIDURAL INFUSION


 EPI


   17 22:30


 3/2/17 17:00 DC 3/2/17 07:26


 


 


 Acetaminophen 650


 mg  650 mg  Q4H  PRN


 PO


 PAIN AND OR ELEVATED TEMP  3/1/17 23:30


 3/2/17 17:00 DC 3/1/17 23:38


 


 


 Ampicillin  100 ml @ 


 100 mls/hr  ONCE  ONCE


 IV


   3/1/17 23:30


 3/2/17 00:29 DC 3/1/17 23:36


 


 


 Ampicillin  50 ml @ 


 100 mls/hr  Q6H


 IV


   3/2/17 06:00


    3/3/17 05:35


 


 


 Gentamicin Sulfate  50 ml @ 


 104 mls/hr  Q8H


 IVPB


   3/2/17 00:30


    3/3/17 09:43


 


 


 Ampicillin  100 ml @ ud  STK-MED ONCE


 .ROUTE


   3/1/17 23:30


 3/1/17 23:31 DC  


 


 


 Cefazolin Sodium/


 Dextrose


  (Ancef 2 Gm/50


 ml (Pmx))  50 ml @ 


 100 mls/hr  ONCE  ONCE


 IVPB


   3/2/17 09:00


 3/2/17 17:51 DC  


 


 


 Citric Acid/


 Sodium Citrate


  (Bicitra)  30 ml  STK-MED ONCE


 .ROUTE


   3/2/17 09:38


 3/2/17 09:39 DC  


 


 


 Lidocaine/


 Epinephrine 30 ml  30 ml  STK-MED ONCE


 .ROUTE


   3/2/17 10:23


 3/2/17 10:24 DC  


 


 


 Propofol


  (Diprivan)  20 ml @ ud  STK-MED ONCE


 .ROUTE


   3/2/17 10:24


 3/2/17 10:25 DC  


 


 


 Morphine Sulfate


  (Duramorph)  10 mg  STK-MED ONCE


 .ROUTE


   3/2/17 10:24


 3/2/17 10:25 DC  


 


 


 Ondansetron HCl


  (Zofran Inj)  4 mg  STK-MED ONCE


 .ROUTE


   3/2/17 10:24


 3/2/17 10:25 DC  


 


 


 Ketorolac


 Tromethamine


  (Toradol)  30 mg  STK-MED ONCE


 .ROUTE


   3/2/17 10:25


 3/2/17 10:26 DC  


 


 


 Fentanyl 100 mcg  100 mcg  STK-MED ONCE


 .ROUTE


   3/2/17 10:48


 3/2/17 10:49 DC  


 


 


 Propofol


  (Diprivan)  20 ml @ ud  STK-MED ONCE


 .ROUTE


   3/2/17 11:04


 3/2/17 11:05 DC  


 


 


 Hydromorphone HCl


  (Dilaudid (Rec))  0.2 mg  PACU ORDER PRN


 IV


 MILD PAIN LEVEL 1-3  3/2/17 11:30


 3/2/17 15:30 DC  


 


 


 Hydromorphone HCl


  (Dilaudid (Rec))  0.4 mg  PACU ORDER PRN


 IV


 MODERATE PAIN LEVEL 4-6  3/2/17 11:30


 3/2/17 15:30 DC  


 


 


 Hydromorphone HCl


  (Dilaudid (Rec))  0.6 mg  PACU ORDER PRN


 IV


 SEVERE PAIN LEVEL 7-10  3/2/17 11:30


 3/2/17 15:30 DC  


 


 


 Ondansetron HCl


  (Zofran Inj)  4 mg  PACU ORDER PRN


 IV


 NAUSEA AND/OR VOMITING  3/2/17 11:30


 3/2/17 15:30 DC  


 


 


 Metoclopramide HCl


  (Reglan)  10 mg  PACU ORDER PRN


 IV


 NAUSEA AND/OR VOMITING  3/2/17 11:30


 3/2/17 15:30 DC  


 


 


 Meperidine HCl


  (Demerol)  25 mg  PACU ORDER PRN


 IV


 POST-OP RIGORS  3/2/17 11:30


 3/2/17 15:30 DC  


 


 


 Diphenhydramine


 HCl


  (Benadryl)  25 mg  PACU ORDER PRN


 IV


 PRURITUS  3/2/17 11:30


 3/2/17 15:30 DC  


 


 


 Naloxone HCl


  (Narcan)  0.1 mg  Q2M  PRN


 IV


 FOR RESP RATE 8 OR LESS  3/2/17 11:30


 3/3/17 11:29   


 


 


 Ketorolac


 Tromethamine


  (Toradol)  30 mg  Q6H  PRN


 IV


 PAIN  3/2/17 11:30


 3/3/17 11:29  3/3/17 10:27


 


 


 Hydromorphone HCl


  (Dilaudid)  1 mg  Q3H  PRN


 IV


 BREAKTHROUGH PAIN  3/2/17 11:30


 3/3/17 11:29   


 


 


 Hydromorphone HCl


  (Dilaudid)  0.2 mg  Q3H  PRN


 IV


 PAIN LEVEL 1-5  3/2/17 11:30


 3/3/17 11:29   


 


 


 Hydromorphone HCl


  (Dilaudid)  0.4 mg  Q3H  PRN


 IV


 PAIN LEVEL 6-10  3/2/17 11:30


 3/3/17 11:29   


 


 


 Diphenhydramine


 HCl


  (Benadryl)  25 mg  Q6H  PRN


 IV


 ITCHING  3/2/17 11:30


 3/3/17 11:29   


 


 


 Ondansetron HCl


  (Zofran Inj)  4 mg  Q6H  PRN


 IV


 NAUSEA AND/OR VOMITING  3/2/17 11:30


 3/3/17 11:29   


 


 


 Acetaminophen/


 Codeine Phosphate


  (Tylenol No.3)  1 tab  Q4H  PRN


 PO


 PAIN LEVEL 4-6  3/2/17 17:00


     


 


 


 Acetaminophen/


 Codeine Phosphate


  (Tylenol No.3)  2 tab  Q4H  PRN


 PO


 PAIN LEVEL 7-10  3/2/17 17:00


     


 


 


 Oxycodone/


 Acetaminophen


  (Percocet (5/


 325))  1 tab  Q4H  PRN


 PO


 PAIN LEVEL 4-6  3/2/17 17:00


     


 


 


 Oxycodone/


 Acetaminophen


  (Percocet (5/


 325))  2 tab  Q4H  PRN


 PO


 PAIN LEVEL 7-10  3/2/17 17:00


     


 


 


 Ibuprofen


  (Motrin)  600 mg  Q6


 PO


   3/2/17 18:00


     


 


 


 Simethicone


  (Mylicon)  160 mg  Q8H  PRN


 PO


 DISTENSION/GAS/BLOATING  3/2/17 17:00


     


 


 


 Senna/Docusate


 Sodium


  (Senokot-S)  1 tab  BID


 PO


   3/3/17 09:00


    3/3/17 09:42


 


 


 Lanolin


  (Ralph-O-Soothe)  1 applic  BEDSIDE MEDICATION  PRN


 TOP


 BEDSIDE FOR THALIA TO NIPPLES  3/2/17 17:00


     


 


 


 Diphtheria/


 Tetanus/Acell


 Pertussis 0.5 ml  0.5 ml  ONCE ONCE


 IM*


   3/5/17 09:00


 3/5/17 09:01   


 


 


 Oxytocin/Lactated


 Ringer's  500 ml @ 0


 mls/hr  ONCE PRN


 IV


 For Hemorrhage Management  3/2/17 17:00


     


 


 


 Methylergonovine


 Maleate


  (Methergine)  0.2 mg  ONCE PRN


 IM


 VAGINAL BLEEDING  3/2/17 17:00


     


 


 


 Carboprost


 Tromethamine


  (Hemabate)  250 mcg  ONCE PRN


 IM


 VAGINAL BLEEDING  3/2/17 17:00


     


 


 


 Misoprostol 1000


 mcg  1,000 mcg  ONCE PRN


 NE


 VAGINAL BLEEDING  3/2/17 17:00


     


 


 


 Cefazolin Sodium  50 ml @ 


 100 mls/hr  ONCE


 IVPB


   3/2/17 17:00


 3/2/17 17:29 DC  


 


 


 Oxytocin/Lactated


 Ringer's  500 ml @ 


 125 mls/hr  Q4H


 IV


   3/2/17 16:56


    3/3/17 03:58


 


 


 Lactated Ringer's


  (Lr)  1,000 ml @ 


 125 mls/hr  Q8H


 IV


   3/3/17 04:30


    3/3/17 10:27


 

















JOHANA GUNDERSON MD Mar 3, 2017 10:31

## 2017-03-03 NOTE — PN
Date/Time of Note


Date/Time of Note


DATE: 3/3/17 


TIME: 10:32





OB Subjective


Subjective


Subjective


Afebrile, WBC down from yesterday to 14,000, currently on gentamicin and 

ampicillin will continue antibiotic until a.m..  Abdomen is soft bowel sounds 

present no signs of inflammation on the area of incision lochia moderate 

extremity normal











JOHANA GUNDERSON MD Mar 3, 2017 10:34

## 2017-03-04 VITALS — RESPIRATION RATE: 18 BRPM | SYSTOLIC BLOOD PRESSURE: 103 MMHG | HEART RATE: 89 BPM | DIASTOLIC BLOOD PRESSURE: 60 MMHG

## 2017-03-04 VITALS — RESPIRATION RATE: 19 BRPM | HEART RATE: 66 BPM | DIASTOLIC BLOOD PRESSURE: 80 MMHG | SYSTOLIC BLOOD PRESSURE: 124 MMHG

## 2017-03-04 VITALS — RESPIRATION RATE: 18 BRPM | HEART RATE: 84 BPM | DIASTOLIC BLOOD PRESSURE: 71 MMHG | SYSTOLIC BLOOD PRESSURE: 112 MMHG

## 2017-03-04 VITALS — RESPIRATION RATE: 20 BRPM | SYSTOLIC BLOOD PRESSURE: 126 MMHG | DIASTOLIC BLOOD PRESSURE: 74 MMHG | HEART RATE: 90 BPM

## 2017-03-04 RX ADMIN — DOCUSATE SODIUM AND SENNOSIDES SCH TAB: 8.6; 5 TABLET, FILM COATED ORAL at 09:44

## 2017-03-04 RX ADMIN — PYRIDOXINE HYDROCHLORIDE SCH MLS/HR: 100 INJECTION, SOLUTION INTRAMUSCULAR; INTRAVENOUS at 04:30

## 2017-03-04 RX ADMIN — Medication SCH MLS/HR: at 04:56

## 2017-03-04 RX ADMIN — DOCUSATE SODIUM AND SENNOSIDES SCH TAB: 8.6; 5 TABLET, FILM COATED ORAL at 21:18

## 2017-03-04 RX ADMIN — IBUPROFEN SCH MG: 600 TABLET ORAL at 11:44

## 2017-03-04 RX ADMIN — IBUPROFEN SCH MG: 600 TABLET ORAL at 00:00

## 2017-03-04 RX ADMIN — IBUPROFEN SCH MG: 600 TABLET ORAL at 23:22

## 2017-03-04 RX ADMIN — AMPICILLIN SODIUM SCH MLS/HR: 1 INJECTION, POWDER, FOR SOLUTION INTRAMUSCULAR; INTRAVENOUS at 05:33

## 2017-03-04 RX ADMIN — GENTAMICIN SULFATE SCH MLS/HR: 80 INJECTION, SOLUTION INTRAVENOUS at 00:24

## 2017-03-04 RX ADMIN — IBUPROFEN SCH MG: 600 TABLET ORAL at 05:33

## 2017-03-04 RX ADMIN — GENTAMICIN SULFATE SCH MLS/HR: 80 INJECTION, SOLUTION INTRAVENOUS at 08:03

## 2017-03-04 RX ADMIN — IBUPROFEN SCH MG: 600 TABLET ORAL at 17:19

## 2017-03-04 RX ADMIN — Medication SCH MLS/HR: at 00:56

## 2017-03-04 NOTE — PN
Date/Time of Note


Date/Time of Note


DATE: 3/4/17 


TIME: 09:41





OB Subjective


Subjective


Subjective


Postoperative day 2


VS stable, afebrile no temperature the last 2 days, abdomen soft uterus firm 

incision dry bowel sounds present no bowel movement ,ambulation recommended











JOHANA GUNDERSON MD Mar 4, 2017 09:43

## 2017-03-05 VITALS — SYSTOLIC BLOOD PRESSURE: 117 MMHG | HEART RATE: 81 BPM | RESPIRATION RATE: 20 BRPM | DIASTOLIC BLOOD PRESSURE: 70 MMHG

## 2017-03-05 VITALS — SYSTOLIC BLOOD PRESSURE: 116 MMHG | DIASTOLIC BLOOD PRESSURE: 70 MMHG | RESPIRATION RATE: 18 BRPM | HEART RATE: 75 BPM

## 2017-03-05 VITALS — RESPIRATION RATE: 18 BRPM | DIASTOLIC BLOOD PRESSURE: 66 MMHG | SYSTOLIC BLOOD PRESSURE: 114 MMHG | HEART RATE: 82 BPM

## 2017-03-05 RX ADMIN — IBUPROFEN SCH MG: 600 TABLET ORAL at 06:12

## 2017-03-05 RX ADMIN — IBUPROFEN SCH MG: 600 TABLET ORAL at 11:29

## 2017-03-05 RX ADMIN — DOCUSATE SODIUM AND SENNOSIDES SCH TAB: 8.6; 5 TABLET, FILM COATED ORAL at 09:32

## 2017-03-05 RX ADMIN — IBUPROFEN SCH MG: 600 TABLET ORAL at 17:27

## 2017-03-05 NOTE — DS
Date/Time of Note


Date/Time of Note


DATE: 3/5/17 


TIME: 16:00





Obstetrical Discharge Record


Final Diagnosis


Final Diagnosis:  Term delivered





 Section


 Section:  Primary





Condition on Discharge


Physical Assessment


Last Vitals:


Post  day 3


Vital signs stable, patient has been afebrile since the day of delivery she 

only had a low-grade fever during the labor she was covered with antibiotics 

responded well and has been afebrile during the past 48 hours she is being 

discharged home with follow-up instructions recommended to make appointment in 

4 days to discontinue staples.


Voiding:  Yes


Bowel Movement:  Yes


Breast:  Filling


Abdomen and Incision:


Healing well dry free of inflammation


Calf Tenderness:  No


Patient Condition:  Good





Meds/Labs/Orders


Medication List:





 Current Medications


Acetaminophen/ Codeine Phosphate (Tylenol No.3) 1 tab Q4H  PRN PO PAIN LEVEL 4-6

;  Start 3/2/17 at 17:00


Acetaminophen/ Codeine Phosphate (Tylenol No.3) 2 tab Q4H  PRN PO PAIN LEVEL 7-

10;  Start 3/2/17 at 17:00


Oxycodone/ Acetaminophen (Percocet (5/ 325)) 1 tab Q4H  PRN PO PAIN LEVEL 4-6;  

Start 3/2/17 at 17:00


Oxycodone/ Acetaminophen (Percocet (5/ 325)) 2 tab Q4H  PRN PO PAIN LEVEL 7-10 

Last administered on 3/5/17at 14:44; Admin Dose 2 TAB;  Start 3/2/17 at 17:00


Ibuprofen (Motrin) 600 mg Q6 PO  Last administered on 3/5/17at 11:29; Admin 

Dose 600 MG;  Start 3/2/17 at 18:00


Simethicone (Mylicon) 160 mg Q8H  PRN PO DISTENSION/GAS/BLOATING;  Start 3/2/17 

at 17:00


Senna/Docusate Sodium 1 tab 1 tab BID PO  Last administered on 3/5/17at 09:32; 

Admin Dose 1 TAB;  Start 3/3/17 at 09:00


Oxytocin/Lactated Ringer's 500 ml @ 0 mls/hr ONCE PRN IV For Hemorrhage 

Management;  Start 3/2/17 at 17:00


Methylergonovine Maleate (Methergine) 0.2 mg ONCE PRN IM VAGINAL BLEEDING;  

Start 3/2/17 at 17:00


Carboprost Tromethamine (Hemabate) 250 mcg ONCE PRN IM VAGINAL BLEEDING;  Start 

3/2/17 at 17:00


Misoprostol (Cytotec) 1,000 mcg ONCE PRN MD VAGINAL BLEEDING;  Start 3/2/17 at 

17:00


My orders:





 Orders-JOHANA GUNDERSON MD


Diet Regular (3/5/17 Breakfast)


Discharge (3/5/17 )











JOHANA GUNDERSON MD Mar 5, 2017 16:05

## 2017-03-05 NOTE — PD.PPDC
OB/GYN Discharge Instruction


Condition


Patient Condition:  Good





Diet


Diet:  Resume Regular Diet





Activity/Restrictions








Restrictions:   No Exercising





 No Lifting





 No Driving





 No Sexual Activity





 Nothing in the Vagina





 No [place]





 No Tampons, douche











Wound/Drain Care Instructions








Wound/Drain Care Instructions:   Remove Steri Strips in 1 week











Follow-up


Follow-up with Physician:  4, Day/Days


Provider Information:


Appointment clinic in 4 days to discontinue her staples





Return to clinic for








GYN Instructions:   Fever greater than 101





 Worsening abdominal pain





 More than 2 pads per hour














OB Instructions:   Breast Tenderness





 Blurried Vision





 Headache














Surgical Instructions:   Incisional Drainage





 Incisional Redness

















JOHANA GUNDERSON MD Mar 5, 2017 15:56

## 2019-02-17 ENCOUNTER — HOSPITAL ENCOUNTER (INPATIENT)
Dept: HOSPITAL 10 - L-D | Age: 24
LOS: 3 days | Discharge: HOME | End: 2019-02-20
Attending: SPECIALIST | Admitting: SPECIALIST
Payer: MEDICAID

## 2019-02-17 ENCOUNTER — HOSPITAL ENCOUNTER (INPATIENT)
Dept: HOSPITAL 91 - L-D | Age: 24
LOS: 3 days | Discharge: HOME | End: 2019-02-20
Payer: MEDICAID

## 2019-02-17 VITALS — SYSTOLIC BLOOD PRESSURE: 106 MMHG | HEART RATE: 64 BPM | RESPIRATION RATE: 18 BRPM | DIASTOLIC BLOOD PRESSURE: 64 MMHG

## 2019-02-17 VITALS — HEART RATE: 92 BPM | SYSTOLIC BLOOD PRESSURE: 130 MMHG | RESPIRATION RATE: 17 BRPM | DIASTOLIC BLOOD PRESSURE: 79 MMHG

## 2019-02-17 VITALS — SYSTOLIC BLOOD PRESSURE: 120 MMHG | RESPIRATION RATE: 18 BRPM | HEART RATE: 77 BPM | DIASTOLIC BLOOD PRESSURE: 64 MMHG

## 2019-02-17 VITALS
WEIGHT: 173.72 LBS | HEIGHT: 60 IN | WEIGHT: 173.72 LBS | HEIGHT: 60 IN | BODY MASS INDEX: 34.11 KG/M2 | BODY MASS INDEX: 34.11 KG/M2

## 2019-02-17 VITALS — DIASTOLIC BLOOD PRESSURE: 62 MMHG | HEART RATE: 78 BPM | RESPIRATION RATE: 20 BRPM | SYSTOLIC BLOOD PRESSURE: 120 MMHG

## 2019-02-17 VITALS — RESPIRATION RATE: 18 BRPM | HEART RATE: 77 BPM | DIASTOLIC BLOOD PRESSURE: 72 MMHG | SYSTOLIC BLOOD PRESSURE: 118 MMHG

## 2019-02-17 DIAGNOSIS — Z30.2: ICD-10-CM

## 2019-02-17 DIAGNOSIS — O34.211: Primary | ICD-10-CM

## 2019-02-17 DIAGNOSIS — Z3A.39: ICD-10-CM

## 2019-02-17 LAB
ADD MAN DIFF?: NO
BASOPHIL #: 0 10^3/UL (ref 0–0.1)
BASOPHILS %: 0.2 % (ref 0–2)
EOSINOPHILS #: 0.1 10^3/UL (ref 0–0.5)
EOSINOPHILS %: 1.1 % (ref 0–7)
HEMATOCRIT: 32.4 % (ref 37–47)
HEMOGLOBIN: 10.3 G/DL (ref 12–16)
HEPATITIS B SURFACE ANTIGEN: NEGATIVE
IMMATURE GRANS #M: 0.13 10^3/UL (ref 0–0.03)
IMMATURE GRANS % (M): 1.4 % (ref 0–0.43)
INR: 0.87
LYMPHOCYTES #: 2.4 10^3/UL (ref 0.8–2.9)
LYMPHOCYTES %: 25.3 % (ref 15–51)
MEAN CORPUSCULAR HEMOGLOBIN: 26.1 PG (ref 29–33)
MEAN CORPUSCULAR HGB CONC: 31.8 G/DL (ref 32–37)
MEAN CORPUSCULAR VOLUME: 82 FL (ref 82–101)
MEAN PLATELET VOLUME: 8.9 FL (ref 7.4–10.4)
MONOCYTE #: 0.6 10^3/UL (ref 0.3–0.9)
MONOCYTES %: 6.3 % (ref 0–11)
NEUTROPHIL #: 6.2 10^3/UL (ref 1.6–7.5)
NEUTROPHILS %: 65.7 % (ref 39–77)
NUCLEATED RED BLOOD CELLS #: 0 10^3/UL (ref 0–0)
NUCLEATED RED BLOOD CELLS%: 0 /100WBC (ref 0–0)
PARTIAL THROMBOPLASTIN TIME: 28.2 SEC (ref 23–35)
PLATELET COUNT: 423 10^3/UL (ref 140–415)
PROTIME: 11.9 SEC (ref 11.9–14.9)
PT RATIO: 0.9
RAPID PLASMA REAGIN: NONREACTIVE
RED BLOOD COUNT: 3.95 10^6/UL (ref 4.2–5.4)
RED CELL DISTRIBUTION WIDTH: 14.7 % (ref 11.5–14.5)
WHITE BLOOD COUNT: 9.5 10^3/UL (ref 4.8–10.8)

## 2019-02-17 PROCEDURE — 87340 HEPATITIS B SURFACE AG IA: CPT

## 2019-02-17 PROCEDURE — 86901 BLOOD TYPING SEROLOGIC RH(D): CPT

## 2019-02-17 PROCEDURE — 85610 PROTHROMBIN TIME: CPT

## 2019-02-17 PROCEDURE — 86900 BLOOD TYPING SEROLOGIC ABO: CPT

## 2019-02-17 PROCEDURE — 86592 SYPHILIS TEST NON-TREP QUAL: CPT

## 2019-02-17 PROCEDURE — 86850 RBC ANTIBODY SCREEN: CPT

## 2019-02-17 PROCEDURE — 85730 THROMBOPLASTIN TIME PARTIAL: CPT

## 2019-02-17 PROCEDURE — 85025 COMPLETE CBC W/AUTO DIFF WBC: CPT

## 2019-02-17 PROCEDURE — 0UB70ZZ EXCISION OF BILATERAL FALLOPIAN TUBES, OPEN APPROACH: ICD-10-PCS | Performed by: SPECIALIST

## 2019-02-17 PROCEDURE — 0UB70ZZ EXCISION OF BILATERAL FALLOPIAN TUBES, OPEN APPROACH: ICD-10-PCS

## 2019-02-17 RX ADMIN — Medication 1 APPLIC: at 16:41

## 2019-02-17 RX ADMIN — PYRIDOXINE HYDROCHLORIDE 1 MLS/HR: 100 INJECTION, SOLUTION INTRAMUSCULAR; INTRAVENOUS at 21:30

## 2019-02-17 RX ADMIN — Medication 1 MLS/HR: at 11:14

## 2019-02-17 RX ADMIN — PYRIDOXINE HYDROCHLORIDE SCH MLS/HR: 100 INJECTION, SOLUTION INTRAMUSCULAR; INTRAVENOUS at 08:31

## 2019-02-17 RX ADMIN — DOCUSATE SODIUM AND SENNOSIDES 1 TAB: 8.6; 5 TABLET, FILM COATED ORAL at 20:46

## 2019-02-17 RX ADMIN — IBUPROFEN SCH MG: 600 TABLET ORAL at 18:00

## 2019-02-17 RX ADMIN — Medication 1 MLS/HR: at 11:40

## 2019-02-17 RX ADMIN — DOCUSATE SODIUM AND SENNOSIDES SCH TAB: 8.6; 5 TABLET, FILM COATED ORAL at 20:46

## 2019-02-17 RX ADMIN — PYRIDOXINE HYDROCHLORIDE 1 MLS/HR: 100 INJECTION, SOLUTION INTRAMUSCULAR; INTRAVENOUS at 08:31

## 2019-02-17 RX ADMIN — DOCUSATE SODIUM AND SENNOSIDES SCH TAB: 8.6; 5 TABLET, FILM COATED ORAL at 09:00

## 2019-02-17 RX ADMIN — PYRIDOXINE HYDROCHLORIDE 1 MLS/HR: 100 INJECTION, SOLUTION INTRAMUSCULAR; INTRAVENOUS at 13:26

## 2019-02-17 RX ADMIN — PYRIDOXINE HYDROCHLORIDE SCH MLS/HR: 100 INJECTION, SOLUTION INTRAMUSCULAR; INTRAVENOUS at 21:30

## 2019-02-17 RX ADMIN — IBUPROFEN 1 MG: 600 TABLET ORAL at 18:00

## 2019-02-17 RX ADMIN — SODIUM CITRATE AND CITRIC ACID MONOHYDRATE 1 ML: 500; 334 SOLUTION ORAL at 07:21

## 2019-02-17 RX ADMIN — IBUPROFEN 1 MG: 600 TABLET ORAL at 12:00

## 2019-02-17 RX ADMIN — PYRIDOXINE HYDROCHLORIDE SCH MLS/HR: 100 INJECTION, SOLUTION INTRAMUSCULAR; INTRAVENOUS at 05:47

## 2019-02-17 RX ADMIN — IBUPROFEN SCH MG: 600 TABLET ORAL at 12:00

## 2019-02-17 RX ADMIN — DOCUSATE SODIUM AND SENNOSIDES 1 TAB: 8.6; 5 TABLET, FILM COATED ORAL at 09:00

## 2019-02-17 RX ADMIN — PYRIDOXINE HYDROCHLORIDE 1 MLS/HR: 100 INJECTION, SOLUTION INTRAMUSCULAR; INTRAVENOUS at 05:47

## 2019-02-17 NOTE — PAC
Date/Time of Note


Date/Time of Note


DATE: 2/17/19 


TIME: 12:00





Post-Anesthesia Notes


Post-Anesthesia Note


Last documented vital signs





Vital Signs


  Date      Temp  Pulse  Resp  B/P (MAP)   Pulse Ox  O2          O2 Flow    FiO2


Time                                                 Delivery    Rate


   2/17/19  98.5     92    17      130/79            Room Air


     05:28                           (96)


@ 11:59 /66, Sat 97%  HR87 RR19, Temp 98


Activity:  WNL


Respiratory function:  WNL


Cardiovascular function:  WNL


Mental status:  Baseline


Pain reasonably controlled:  Yes


Hydration appropriate:  Yes


Nausea/Vomiting absent:  No











MARIJA BRICE MD            Feb 17, 2019 12:02

## 2019-02-17 NOTE — PREAC
Date/Time of Note


Date/Time of Note


DATE: 19 


TIME: 07:09





Anesthesia Eval and Record


Evaluation


Time Pre-Procedure Interview


DATE: 19 


TIME: 07:09


Age


24


Sex


female


NPO:  8 hrs


Preoperative diagnosis


repeat c section


Planned procedure


c section





Past Medical History


Past Medical History:  Includes


Pregnancy:  : (2), Gestational age: (39)





Surgery & Anesthesia Issues


No known issue





Meds


Anticoagulation:  No


Beta Blocker within 24 hr:  No


Reason Beta Blocker not given:  Pt. not on B-Blocker


Reported Medications


Ferrous Sulfate (Iron) 134 Mg Tablet, 134 MG PO, TAB


   17


Multivit/Min/Fol Ac/Iron/Pren* (Prenatal S*) 1 Tab Tab, 1 TAB PO DAILY, TAB


   17





Current Medications


Lactated Ringer's 1,000 ml @  125 mls/hr Q8H IV  Last administered on 19at 


05:47; Admin Dose 125 MLS/HR;  Start 19 at 05:41


Cefazolin Sodium/ Dextrose 50 ml @  100 mls/hr ONCE IVPB ;  Start 19 at 


06:00


Oxytocin/Lactated Ringer's 500 ml @  125 mls/hr POST PARTUM IV ;  Start 19 


at 06:00


Oxytocin/Lactated Ringer's 500 ml @ 0 mls/hr ONCE PRN IV .VAGINAL BLEEDING;  


Start 19 at 06:00


Methylergonovine Maleate (Methergine) 0.2 mg ONCE PRN IM .VAGINAL BLEEDING;  


Start 19 at 06:00


Carboprost Tromethamine (Hemabate) 250 mcg ONCE PRN IM .VAGINAL BLEEDING;  Start


19 at 06:00


Misoprostol (Cytotec) 1,000 mcg ONCE PRN IL .VAGINAL BLEEDING;  Start 19 at


06:00


Meds reviewed:  Yes





Allergies


Coded Allergies:  


     No Known Allergy (Unverified , 17)


Allergies Reviewed:  Yes





Labs/Studies


Labs Reviewed:  Reviewed by anesthesiologist


Result Diagram:  


19 0595





Laboratory Tests


19 05:35











Blood Bank


                  Test
                         19
05:35


                  Blood Type                    O POSITIVE


                  Rh Immune Globulin Candidate  NO





Pregnancy test:  Positive


Studies:  ECG (n/a), CXR (n/a)





Pre-procedure Exam


Last vitals





Vital Signs


  Date      Temp  Pulse  Resp  B/P (MAP)   Pulse Ox  O2          O2 Flow    FiO2


Time                                                 Delivery    Rate


   19  98.5     92    17      130/79            Room Air


     05:28                           (96)





Airway:  Adequate mouth opening


Mallampati:  Mallampati I


Teeth:  Normal


Lung:  Normal


Heart:  Normal





ASA Physical Status


ASA physical status:  2


Emergency:  None





Planned Anesthetic


Neuraxial:  Spinal





Planned Pain Management


Sub-arachniod narcotics





Pre-operative Attestations


Prior to commencing anesthesia and surgery, the patient was re-evaluated, there 


was verification of:


*The patient's identity


*The results of appropriate recent lab work and preoperative vital signs


*The above evaluation not changing prior to induction


*Anesthetic plan, risk benefits, alternative and complications discussed with 


patient/family; questions answered; patient/family understands, accepts and 


wishes to proceed.











MARIJA BRICE MD            2019 07:10

## 2019-02-17 NOTE — HP
Date/Time of Note


Date/Time of Note


DATE: 19 


TIME: 08:45





OB - History


Hx of Present Pregnancy


Free Text/Dictation


25 YO  with IUP at 39 weks with EDC 2019 with history of previous 


 delivery, who desires to have repeat  delivery.  I discussed 


with the patient the risks, benefits, indications, and alternatives of procedure


including but not limited to risks of infection, bleeding, damage to other 


organs, bowel, bladder, hernia formation, scar formation, possibility of blood 


transfusion, possible need for emergency hysterectomy. She was allowed to ask 


questions.  All her questions were answered.  Informed consent has been 


obtained.


Prenatal Care:  Good Care


Ultrasounds:  Normal mid trimester US


Obstetrical Complications:  None


Medical Complications:  None





Past Family/Social History


*


Past Medical, Surgical, Family and Obstetric Histories reviewed from prenatal 


chart.





OB  Admission Exam


Vital Signs


Vital Signs





Vital Signs


  Date      Temp  Pulse  Resp  B/P (MAP)   Pulse Ox  O2          O2 Flow    FiO2


Time                                                 Delivery    Rate


   19  98.5     92    17      130/79            Room Air


     05:28                           (96)








Physical Exam


HEENT:  WNL


Heart:  Rhythm Normal


Lungs:  Clear, Equal


Abdomen:  WNL


Extremities:  Normal


Reflexes:  Normal


Last 72 hours Lab Results


                                    CBC & BMP


19 05:35











OB  Assessment/Plan


Reason for admission:   section


Plan:   Section











ELOISE ROBERTS MD            2019 08:47

## 2019-02-17 NOTE — OPR
Date/Time of Note


Date/Time of Note


DATE: 19 


TIME: 09:46





Operative Report


Procedure Date:  2019


Preoperative Diagnosis


h/o prior  delivery


IUP at 39 weeks


Postoperative Diagnosis


Same


Operation/Procedure Performed


Repeat  Delivery


Surgeon


Henrietta Santoro MD


Assistant


Everton Matt MD


Anesthesia Type:  spinal


Estimated Blood Loss:  other (700)


Transfusion


   none


Specimen


none


Grafts/Implants


none


Tubes/Drains


Cervantes Cath


Complications


none


Pt Condition Post Procedure:  stable


Disposition:  PACU


Procedure Description


DATE OF OPERATION:  


 


 


PREOPERATIVE DIAGNOSES:


1.  Term pregnancy, history of previous  delivery:


2.  Desires repeat  delivery.


3.  Desires permanent sterilization


 


POSTOPERATIVE DIAGNOSES:


1.  Same


 


OPERATION PERFORMED:  


Repeat  delivery 


Bilateral distal salpingectomy 


 


SURGEON:  Henrietta Santoro MD


 


ASSISTANT:  , MD


 


ESTIMATED BLOOD LOSS:  700 mL.


 


COMPLICATIONS:  None.


 


The risks, benefits, indications, alternatives of procedure including, but not 


limited to risk of infection, bleeding, damage to other organs, bowel, bladder, 


hernia formation, scar formation, possibility of blood transfusions, the risks 


of tubal ligation such as failure and future pregnancies were discussed with the


patient. She was allowed to ask questions.  All her questions were answered.  


Informed consent was obtained.


 


DESCRIPTION OF PROCEDURE:  She was taken to the operating room.  Spinal 


anesthesia was induced.  She was prepped and draped in the usual sterile 


fashion.  


Surgical time out one. Anesthesia was tested to be adequate. With permission 


from anesthesiologist, a knife was used to make a Pfannenstiel skin incision. 


The incision was taken down in layers.  The fascia was cut, undermined and sep


arated from the underlying muscle using sharp and blunt dissection.  All the 


bleeders were cauterized.  Peritoneum was entered bluntly.  A low transverse 


incision was developed over the uterus.  Amniotic fluid was clear and adequate. 


A viable infant in vertex presentation was delivered without any difficulty.  


The cord was clamped and cut, handed to awaiting team.  Placenta was then 


delivered.  Uterus was exteriorized, wrapped around a moist lap. Inside uterus 


was cleaned using a dry lap.  All residual membranes were removed.  The uterine 


incision was then closed using #1 Monocryl in 2 layers.  The uterus was inserted


back inside the abdominal cavity.  Irrigation was done carefully.  Careful 


evaluation of the uterine incision revealed no further bleeding.  The peritoneum


and rectus muscles and fascia were evaluated.  All bleeders cauterized.  


Peritoneum was closed using 2-0 Monocryl.  At this time, the count was correct. 


Rectus muscle was reapproximated using 2-0 Monocryl.  Rectus fascia was closed 


using #1 Vicryl.  Subcutaneous tissue was cleaned and irrigated.  All bleeders 


cauterized and the skin closed using 4-0 Monocryl.  All counts correct.











HENRIETTA SANTORO MD            2019 09:50

## 2019-02-18 VITALS — RESPIRATION RATE: 18 BRPM | SYSTOLIC BLOOD PRESSURE: 117 MMHG | HEART RATE: 104 BPM | DIASTOLIC BLOOD PRESSURE: 72 MMHG

## 2019-02-18 VITALS — SYSTOLIC BLOOD PRESSURE: 104 MMHG | HEART RATE: 70 BPM | RESPIRATION RATE: 17 BRPM | DIASTOLIC BLOOD PRESSURE: 62 MMHG

## 2019-02-18 VITALS — HEART RATE: 98 BPM | RESPIRATION RATE: 18 BRPM | SYSTOLIC BLOOD PRESSURE: 122 MMHG | DIASTOLIC BLOOD PRESSURE: 75 MMHG

## 2019-02-18 VITALS — SYSTOLIC BLOOD PRESSURE: 115 MMHG | DIASTOLIC BLOOD PRESSURE: 73 MMHG | RESPIRATION RATE: 18 BRPM | HEART RATE: 77 BPM

## 2019-02-18 VITALS — HEART RATE: 85 BPM | DIASTOLIC BLOOD PRESSURE: 54 MMHG | RESPIRATION RATE: 18 BRPM | SYSTOLIC BLOOD PRESSURE: 106 MMHG

## 2019-02-18 LAB
ADD MAN DIFF?: NO
BASOPHIL #: 0 10^3/UL (ref 0–0.1)
BASOPHILS %: 0.2 % (ref 0–2)
EOSINOPHILS #: 0.1 10^3/UL (ref 0–0.5)
EOSINOPHILS %: 1 % (ref 0–7)
HEMATOCRIT: 25.9 % (ref 37–47)
HEMOGLOBIN: 8.2 G/DL (ref 12–16)
IMMATURE GRANS #M: 0.06 10^3/UL (ref 0–0.03)
IMMATURE GRANS % (M): 0.6 % (ref 0–0.43)
LYMPHOCYTES #: 2.7 10^3/UL (ref 0.8–2.9)
LYMPHOCYTES %: 28.5 % (ref 15–51)
MEAN CORPUSCULAR HEMOGLOBIN: 26.5 PG (ref 29–33)
MEAN CORPUSCULAR HGB CONC: 31.7 G/DL (ref 32–37)
MEAN CORPUSCULAR VOLUME: 83.8 FL (ref 82–101)
MEAN PLATELET VOLUME: 9.3 FL (ref 7.4–10.4)
MONOCYTE #: 0.7 10^3/UL (ref 0.3–0.9)
MONOCYTES %: 7.7 % (ref 0–11)
NEUTROPHIL #: 5.8 10^3/UL (ref 1.6–7.5)
NEUTROPHILS %: 62 % (ref 39–77)
NUCLEATED RED BLOOD CELLS #: 0 10^3/UL (ref 0–0)
NUCLEATED RED BLOOD CELLS%: 0 /100WBC (ref 0–0)
PLATELET COUNT: 360 10^3/UL (ref 140–415)
RED BLOOD COUNT: 3.09 10^6/UL (ref 4.2–5.4)
RED CELL DISTRIBUTION WIDTH: 15.1 % (ref 11.5–14.5)
WHITE BLOOD COUNT: 9.4 10^3/UL (ref 4.8–10.8)

## 2019-02-18 RX ADMIN — DOCUSATE SODIUM AND SENNOSIDES SCH TAB: 8.6; 5 TABLET, FILM COATED ORAL at 10:39

## 2019-02-18 RX ADMIN — IBUPROFEN 1 MG: 600 TABLET ORAL at 13:41

## 2019-02-18 RX ADMIN — KETOROLAC TROMETHAMINE PRN MG: 30 INJECTION, SOLUTION INTRAMUSCULAR at 00:44

## 2019-02-18 RX ADMIN — KETOROLAC TROMETHAMINE 1 MG: 30 INJECTION, SOLUTION INTRAMUSCULAR at 00:44

## 2019-02-18 RX ADMIN — PYRIDOXINE HYDROCHLORIDE 1 MLS/HR: 100 INJECTION, SOLUTION INTRAMUSCULAR; INTRAVENOUS at 05:17

## 2019-02-18 RX ADMIN — DOCUSATE SODIUM AND SENNOSIDES 1 TAB: 8.6; 5 TABLET, FILM COATED ORAL at 10:39

## 2019-02-18 RX ADMIN — IBUPROFEN 1 MG: 600 TABLET ORAL at 06:00

## 2019-02-18 RX ADMIN — KETOROLAC TROMETHAMINE 1 MG: 30 INJECTION, SOLUTION INTRAMUSCULAR at 06:10

## 2019-02-18 RX ADMIN — IBUPROFEN 1 MG: 600 TABLET ORAL at 18:05

## 2019-02-18 RX ADMIN — KETOROLAC TROMETHAMINE PRN MG: 30 INJECTION, SOLUTION INTRAMUSCULAR at 06:10

## 2019-02-18 RX ADMIN — IBUPROFEN 1 MG: 600 TABLET ORAL at 23:50

## 2019-02-18 RX ADMIN — IBUPROFEN SCH MG: 600 TABLET ORAL at 18:05

## 2019-02-18 RX ADMIN — IBUPROFEN SCH MG: 600 TABLET ORAL at 13:41

## 2019-02-18 RX ADMIN — IBUPROFEN SCH MG: 600 TABLET ORAL at 00:00

## 2019-02-18 RX ADMIN — OXYCODONE HYDROCHLORIDE AND ACETAMINOPHEN 1 TAB: 5; 325 TABLET ORAL at 10:40

## 2019-02-18 RX ADMIN — OXYCODONE HYDROCHLORIDE AND ACETAMINOPHEN 1 TAB: 5; 325 TABLET ORAL at 18:06

## 2019-02-18 RX ADMIN — DOCUSATE SODIUM AND SENNOSIDES 1 TAB: 8.6; 5 TABLET, FILM COATED ORAL at 21:05

## 2019-02-18 RX ADMIN — PYRIDOXINE HYDROCHLORIDE SCH MLS/HR: 100 INJECTION, SOLUTION INTRAMUSCULAR; INTRAVENOUS at 05:17

## 2019-02-18 RX ADMIN — IBUPROFEN SCH MG: 600 TABLET ORAL at 23:50

## 2019-02-18 RX ADMIN — IBUPROFEN 1 MG: 600 TABLET ORAL at 00:00

## 2019-02-18 RX ADMIN — IBUPROFEN SCH MG: 600 TABLET ORAL at 06:00

## 2019-02-18 RX ADMIN — DOCUSATE SODIUM AND SENNOSIDES SCH TAB: 8.6; 5 TABLET, FILM COATED ORAL at 21:05

## 2019-02-18 NOTE — OPPN
Date/Time of Note


Date/Time of Note


DATE: 2/18/19 


TIME: 07:57





Anesthesia Follow up


Anesthesia Follow up


Last documented vital signs





Vital Signs


  Date      Temp  Pulse  Resp  B/P (MAP)   Pulse Ox  O2          O2 Flow    FiO2


Time                                                 Delivery    Rate


   2/18/19  99.0    104    18      117/72        97  Room Air


     04:03                           (87)





Respiratory function:  WNL


Cardiovascular function:  WNL


Comments


A 24 year female s/p spinal duramorph POD #1 is doing fine. No pain, headache, 


N?V, itching, neural deficit.











MARIJA BRICE MD            Feb 18, 2019 07:58

## 2019-02-18 NOTE — QN
Documentation


Comment


s/p c/s


Subjective:


no complaint





Objective:


Afebrile, VSS


NAD


A&O


Abdomen: soft, appropriate tender


Incision: no sign of bleeding/infection


mild lochia


Extremity: 1+ edema bilaterally 





Assessment: 


S/p C/S POD # 1


Recovering Well


Plan: current care











ELOISE ROBERTS MD            Feb 18, 2019 14:41

## 2019-02-19 VITALS — DIASTOLIC BLOOD PRESSURE: 71 MMHG | SYSTOLIC BLOOD PRESSURE: 111 MMHG | HEART RATE: 83 BPM | RESPIRATION RATE: 20 BRPM

## 2019-02-19 VITALS — RESPIRATION RATE: 18 BRPM | SYSTOLIC BLOOD PRESSURE: 130 MMHG | DIASTOLIC BLOOD PRESSURE: 81 MMHG | HEART RATE: 86 BPM

## 2019-02-19 VITALS — RESPIRATION RATE: 18 BRPM | HEART RATE: 77 BPM | DIASTOLIC BLOOD PRESSURE: 83 MMHG | SYSTOLIC BLOOD PRESSURE: 109 MMHG

## 2019-02-19 VITALS — DIASTOLIC BLOOD PRESSURE: 61 MMHG | HEART RATE: 79 BPM | SYSTOLIC BLOOD PRESSURE: 115 MMHG | RESPIRATION RATE: 18 BRPM

## 2019-02-19 RX ADMIN — DOCUSATE SODIUM AND SENNOSIDES 1 TAB: 8.6; 5 TABLET, FILM COATED ORAL at 20:48

## 2019-02-19 RX ADMIN — DOCUSATE SODIUM AND SENNOSIDES SCH TAB: 8.6; 5 TABLET, FILM COATED ORAL at 09:00

## 2019-02-19 RX ADMIN — OXYCODONE HYDROCHLORIDE AND ACETAMINOPHEN 1 TAB: 5; 325 TABLET ORAL at 09:00

## 2019-02-19 RX ADMIN — DOCUSATE SODIUM AND SENNOSIDES 1 TAB: 8.6; 5 TABLET, FILM COATED ORAL at 09:00

## 2019-02-19 RX ADMIN — IBUPROFEN 1 MG: 600 TABLET ORAL at 05:22

## 2019-02-19 RX ADMIN — OXYCODONE HYDROCHLORIDE AND ACETAMINOPHEN 1 TAB: 5; 325 TABLET ORAL at 20:48

## 2019-02-19 RX ADMIN — IBUPROFEN SCH MG: 600 TABLET ORAL at 18:27

## 2019-02-19 RX ADMIN — IBUPROFEN 1 MG: 600 TABLET ORAL at 12:31

## 2019-02-19 RX ADMIN — DOCUSATE SODIUM AND SENNOSIDES SCH TAB: 8.6; 5 TABLET, FILM COATED ORAL at 20:48

## 2019-02-19 RX ADMIN — IBUPROFEN SCH MG: 600 TABLET ORAL at 12:31

## 2019-02-19 RX ADMIN — IBUPROFEN SCH MG: 600 TABLET ORAL at 05:22

## 2019-02-19 RX ADMIN — IBUPROFEN SCH MG: 600 TABLET ORAL at 23:34

## 2019-02-19 RX ADMIN — IBUPROFEN 1 MG: 600 TABLET ORAL at 18:27

## 2019-02-19 RX ADMIN — IBUPROFEN 1 MG: 600 TABLET ORAL at 23:34

## 2019-02-19 NOTE — DS
Date/Time of Note


Date/Time of Note


DATE: 19 


TIME: 19:17





Obstetrical Discharge Record


Final Diagnosis


Final Diagnosis:  Term delivered





 Section


 Section:  Repeat





Complications


Augmentation:  No


Induction:  No


 Rupture of Membranes:  No





Condition on Discharge


Physical Assessment


Voiding:  Yes


Bowel Movement:  Yes


Breast:  Soft, non-tender, Filling


Fundus:  Firm


Abdomen and Incision:


soft, appropriate tenderness


Episiotomy:


NA


Calf Tenderness:  No


Patient Condition:  Good











ELOISE ROBERTS MD            2019 19:18

## 2019-02-20 VITALS — HEART RATE: 91 BPM | DIASTOLIC BLOOD PRESSURE: 83 MMHG | SYSTOLIC BLOOD PRESSURE: 121 MMHG | RESPIRATION RATE: 18 BRPM

## 2019-02-20 VITALS — DIASTOLIC BLOOD PRESSURE: 75 MMHG | RESPIRATION RATE: 18 BRPM | HEART RATE: 77 BPM | SYSTOLIC BLOOD PRESSURE: 127 MMHG

## 2019-02-20 RX ADMIN — IBUPROFEN SCH MG: 600 TABLET ORAL at 05:32

## 2019-02-20 RX ADMIN — DOCUSATE SODIUM AND SENNOSIDES 1 TAB: 8.6; 5 TABLET, FILM COATED ORAL at 09:20

## 2019-02-20 RX ADMIN — MEASLES, MUMPS, AND RUBELLA VIRUS VACCINE LIVE 1 ML: 1000; 12500; 1000 INJECTION, POWDER, LYOPHILIZED, FOR SUSPENSION SUBCUTANEOUS at 09:22

## 2019-02-20 RX ADMIN — IBUPROFEN 1 MG: 600 TABLET ORAL at 11:55

## 2019-02-20 RX ADMIN — CLOSTRIDIUM TETANI TOXOID ANTIGEN (FORMALDEHYDE INACTIVATED), CORYNEBACTERIUM DIPHTHERIAE TOXOID ANTIGEN (FORMALDEHYDE INACTIVATED), BORDETELLA PERTUSSIS TOXOID ANTIGEN (GLUTARALDEHYDE INACTIVATED), BORDETELLA PERTUSSIS FILAMENTOUS HEMAGGLUTININ ANTIGEN (FORMALDEHYDE INACTIVATED), BORDETELLA PERTUSSIS PERTACTIN ANTIGEN, AND BORDETELLA PERTUSSIS FIMBRIAE 2/3 ANTIGEN 1 ML: 5; 2; 2.5; 5; 3; 5 INJECTION, SUSPENSION INTRAMUSCULAR at 09:21

## 2019-02-20 RX ADMIN — DOCUSATE SODIUM AND SENNOSIDES SCH TAB: 8.6; 5 TABLET, FILM COATED ORAL at 09:20

## 2019-02-20 RX ADMIN — IBUPROFEN SCH MG: 600 TABLET ORAL at 11:55

## 2019-02-20 RX ADMIN — IBUPROFEN 1 MG: 600 TABLET ORAL at 05:32

## 2020-12-18 NOTE — TRIAGE
===================================

OB Triage

===================================

Datetime Report Generated by CPN: 01/17/2017 23:32

   

   

===========================

Datetime: 01/17/2017 22:56

===========================

   

 Stage of Pregnancy:  OB Triage

   

===================================

Labor Evaluation

===================================

   

 Frequency:  IRREGULAR

      

 Monitor Mode:  External

 Duration (sec)2399:  40-60

      

 Quality:  Mild

 Pattern:  Normal: <= 5 Contractions in 10 Minutes

   

===================================

Fetal Heart Rate

===================================

   

 FHR Baseline Rate:  110

 Monitor Mode:  External US

 Variability:  Moderate 6-25 bpm

 Accelerations:  15X15

 Decelerations:  None

 Category:  Category I

   

===========================

Datetime: 01/17/2017 22:42

===========================

   

 Stage of Pregnancy:  OB Triage

   

===========================

Datetime: 01/17/2017 22:05

===========================

   

 Stage of Pregnancy:  OB Triage

 Assessment Type:  Triage

   

===========================

Datetime: 01/17/2017 22:00

===========================

   

   

===================================

Labor Evaluation

===================================

   

 Frequency:  IRREGULAR

 Monitor Mode:  External

 Duration (sec)2399:  40-60

 Quality:  Mild

 Pattern:  Normal: <= 5 Contractions in 10 Minutes

 Resting Tone [place]:  Relaxed

   

===================================

Fetal Heart Rate

===================================

   

 FHR Baseline Rate:  130

 Monitor Mode:  External US

 Variability:  Moderate 6-25 bpm

 Accelerations:  15X15

 Decelerations:  None

 Category:  Category I

   

===========================

Datetime: 01/17/2017 21:13

===========================

   

 Stage of Pregnancy:  OB Triage

   

===========================

Datetime: 01/17/2017 21:00

===========================

   

   

===================================

Labor Evaluation

===================================

   

 Frequency:  6-10

 Monitor Mode:  External

 Duration (sec)2399:  50-90

 Quality:  Mild

 Pattern:  Normal: <= 5 Contractions in 10 Minutes

 Resting Tone [place]:  Relaxed

   

===================================

Fetal Heart Rate

===================================

   

 FHR Baseline Rate:  115

 Monitor Mode:  External US

 Variability:  Moderate 6-25 bpm

 Accelerations:  15X15

 Decelerations:  None

   

===========================

Datetime: 01/17/2017 20:39

===========================

   

   

===================================

Vaginal Exam

===================================

   

 Dilatation (cms):  0.0

 Effacement (%):  0

 Station:  -3

 Exam By:  BE

 Vaginal Bleeding:  None

 Cervix, Consistency:  Firm

 Cervix, Position:  Posterior

 Fetal Presentation 'A':  Unable to Assess

   

===========================

Datetime: 01/17/2017 20:15

===========================

   

 Membrane Status:  Intact

   

===========================

Datetime: 01/17/2017 20:10

===========================

   

 Stage of Pregnancy:  OB Triage

   

===========================

Datetime: 01/17/2017 20:00

===========================

   

   

===================================

Labor Evaluation

===================================

   

 Frequency:  IRREGULAR 

 Monitor Mode:  External

 Duration (sec)2399:  40-80

 Quality:  Mild

 Pattern:  Normal: <= 5 Contractions in 10 Minutes

 Resting Tone [place]:  Relaxed

   

===================================

Fetal Heart Rate

===================================

   

 FHR Baseline Rate:  120

 Monitor Mode:  External US

 Variability:  Moderate 6-25 bpm

 Accelerations:  15X15

 Decelerations:  None

 Category:  Category I

   

===========================

Datetime: 01/17/2017 19:59

===========================

   

 Time of Arrival:  01/17/2017 19:25

 EGA:  34.6

 Arrived By:  Wheelchair

 Arrived From:  Home

 Chief Complaint:  ABDOMINAL CRAMPING ALL DAY

   DIZZY AND BLURRY VISION SINCE 1600

   NAUSEA SINCE 1700

 Fetal Movement:  Present

 Contractions:  Irregular

 Time Contractions Began:  01/17/2017 08:00

 Rupture of Membranes:  Denies

 Vaginal Bleeding:  None

 Vaginal Discharge:  Denies

 Recent Sexual Intercouse:  Denies

 Abdominal Trauma:  Not Applicable

 Patient Complaints:  Cramping; Visual Disturbance; Nausea

 Time Provider Notified:  01/17/2017 20:05

 Provider Notified:  DINO

 Initial Plan:  CEFM, EFW, BPP, RUTH, CBC, CMP, SVE, IV HYDRATION 

   

===========================

Datetime: 01/17/2017 19:39

===========================

   

 Stage of Pregnancy:  OB Triage

 Assessment Type:  Triage

   

===================================

Maternal Assessment

===================================

   

 Level of Consciousness:  Fully Conscious

 DTR's/Clonus:  DTRs 2+; No Clonus

 Headache:  Denies

 Blurred Vision:  No

 Respiratory Effort:  Unlabored; Regular Rhythm; Equal Expansion

 Breath Sounds, Left:  Clear and Equal

 Breath Sounds, Right:  Clear and Equal

 Nausea/Vomiting:  Denies

 RUQ Epigastric Pain:  Denies

 Lower Extremities Edema:  None

     Degree:  None

 Upper Extremities Edema:  None

     Degree:  None

 Facial Edema:  None

   

===================================

Fall Risk Assessment

===================================

   

 History of Falling:  (0) No

 Secondary Diagnosis:  (0) No

 Ambulatory Aid:  (0) Bedrest/Nurse Assist

 IV Therapy:  (0) No

 Gait:  (0) Normal/Bedrest/Immobile

 Mental Status:  (0) Oriented to Own Ability

 Fall Score:  0

 Fall Risk Score Definition:  No Risk: No action required Quality 431: Preventive Care And Screening: Unhealthy Alcohol Use - Screening: Patient screened for unhealthy alcohol use using a single question and scores less than 2 times per year Quality 110: Preventive Care And Screening: Influenza Immunization: Influenza Immunization Administered during Influenza season Quality 226: Preventive Care And Screening: Tobacco Use: Screening And Cessation Intervention: Tobacco Screening not Performed for Medical Reasons Detail Level: Zone